# Patient Record
Sex: MALE | Race: WHITE | HISPANIC OR LATINO | Employment: OTHER | URBAN - METROPOLITAN AREA
[De-identification: names, ages, dates, MRNs, and addresses within clinical notes are randomized per-mention and may not be internally consistent; named-entity substitution may affect disease eponyms.]

---

## 2023-02-24 PROBLEM — R06.83 SNORING: Status: ACTIVE | Noted: 2023-02-24

## 2023-02-24 PROBLEM — R81 GLUCOSURIA: Status: ACTIVE | Noted: 2023-02-24

## 2023-02-24 PROBLEM — R74.8 ELEVATED CPK: Status: ACTIVE | Noted: 2023-02-24

## 2023-02-24 PROBLEM — E11.22 TYPE 2 DIABETES MELLITUS WITH STAGE 3A CHRONIC KIDNEY DISEASE, WITH LONG-TERM CURRENT USE OF INSULIN (MULTI): Status: ACTIVE | Noted: 2023-02-24

## 2023-02-24 PROBLEM — E66.811 CLASS 1 OBESITY DUE TO EXCESS CALORIES WITH BODY MASS INDEX (BMI) OF 32.0 TO 32.9 IN ADULT: Status: ACTIVE | Noted: 2023-02-24

## 2023-02-24 PROBLEM — E53.8 VITAMIN B12 DEFICIENCY: Status: ACTIVE | Noted: 2023-02-24

## 2023-02-24 PROBLEM — K21.9 GERD (GASTROESOPHAGEAL REFLUX DISEASE): Status: ACTIVE | Noted: 2023-02-24

## 2023-02-24 PROBLEM — N18.31 TYPE 2 DIABETES MELLITUS WITH STAGE 3A CHRONIC KIDNEY DISEASE, WITH LONG-TERM CURRENT USE OF INSULIN (MULTI): Status: ACTIVE | Noted: 2023-02-24

## 2023-02-24 PROBLEM — Z91.199 NONCOMPLIANCE: Status: ACTIVE | Noted: 2023-02-24

## 2023-02-24 PROBLEM — I10 ESSENTIAL HYPERTENSION: Status: ACTIVE | Noted: 2023-02-24

## 2023-02-24 PROBLEM — D63.8 ANEMIA, CHRONIC DISEASE: Status: ACTIVE | Noted: 2023-02-24

## 2023-02-24 PROBLEM — E66.09 CLASS 1 OBESITY DUE TO EXCESS CALORIES WITH BODY MASS INDEX (BMI) OF 32.0 TO 32.9 IN ADULT: Status: ACTIVE | Noted: 2023-02-24

## 2023-02-24 PROBLEM — Z87.828 HISTORY OF INJURY: Status: ACTIVE | Noted: 2023-02-24

## 2023-02-24 PROBLEM — E79.0 HYPERURICEMIA: Status: ACTIVE | Noted: 2023-02-24

## 2023-02-24 PROBLEM — M17.12 PRIMARY OSTEOARTHRITIS OF LEFT KNEE: Status: ACTIVE | Noted: 2023-02-24

## 2023-02-24 PROBLEM — E78.2 MIXED HYPERLIPIDEMIA: Status: ACTIVE | Noted: 2023-02-24

## 2023-02-24 PROBLEM — Z86.16 HISTORY OF COVID-19: Status: ACTIVE | Noted: 2023-02-24

## 2023-02-24 PROBLEM — E55.9 VITAMIN D DEFICIENCY: Status: ACTIVE | Noted: 2023-02-24

## 2023-02-24 PROBLEM — R80.9 MICROALBUMINURIA: Status: ACTIVE | Noted: 2023-02-24

## 2023-02-24 PROBLEM — Z79.4 TYPE 2 DIABETES MELLITUS WITH STAGE 3A CHRONIC KIDNEY DISEASE, WITH LONG-TERM CURRENT USE OF INSULIN (MULTI): Status: ACTIVE | Noted: 2023-02-24

## 2023-02-24 PROBLEM — N18.31 STAGE 3A CHRONIC KIDNEY DISEASE (MULTI): Status: ACTIVE | Noted: 2023-02-24

## 2023-02-24 PROBLEM — T78.3XXA ANGIO-EDEMA: Status: ACTIVE | Noted: 2023-02-24

## 2023-02-24 PROBLEM — R40.0 DAYTIME SLEEPINESS: Status: ACTIVE | Noted: 2023-02-24

## 2023-02-24 RX ORDER — ACETAMINOPHEN 500 MG
TABLET ORAL
COMMUNITY
Start: 2022-09-27

## 2023-02-24 RX ORDER — INSULIN LISPRO 100 [IU]/ML
INJECTION, SOLUTION INTRAVENOUS; SUBCUTANEOUS
COMMUNITY

## 2023-02-24 RX ORDER — SODIUM BICARBONATE 650 MG/1
TABLET ORAL
COMMUNITY
Start: 2021-05-13

## 2023-02-24 RX ORDER — FAMOTIDINE 20 MG/1
TABLET, FILM COATED ORAL
COMMUNITY
Start: 2022-09-27

## 2023-02-24 RX ORDER — ERGOCALCIFEROL 1.25 MG/1
CAPSULE ORAL
COMMUNITY
Start: 2019-08-10 | End: 2024-05-17 | Stop reason: SDUPTHER

## 2023-02-24 RX ORDER — PEN NEEDLE, DIABETIC 29 G X1/2"
NEEDLE, DISPOSABLE MISCELLANEOUS
COMMUNITY

## 2023-02-24 RX ORDER — ACETAMINOPHEN, DEXTROMETHORPHAN HBR, DOXYLAMINE SUCCINATE, PHENYLEPHRINE HCL 650; 20; 12.5; 1 MG/30ML; MG/30ML; MG/30ML; MG/30ML
SOLUTION ORAL
COMMUNITY
Start: 2021-02-26 | End: 2024-05-17 | Stop reason: SDUPTHER

## 2023-02-24 RX ORDER — INSULIN LISPRO 100 [IU]/ML
INJECTION, SOLUTION INTRAVENOUS; SUBCUTANEOUS
COMMUNITY
Start: 2021-04-01

## 2023-02-24 RX ORDER — POTASSIUM CHLORIDE 750 MG/1
TABLET, FILM COATED, EXTENDED RELEASE ORAL
COMMUNITY
Start: 2019-09-11

## 2023-02-24 RX ORDER — AMLODIPINE BESYLATE 5 MG/1
TABLET ORAL
COMMUNITY
End: 2023-04-21

## 2023-02-24 RX ORDER — METOPROLOL SUCCINATE 100 MG/1
TABLET, EXTENDED RELEASE ORAL
COMMUNITY
Start: 2022-05-03

## 2023-02-24 RX ORDER — ASPIRIN 81 MG/1
TABLET ORAL
COMMUNITY
Start: 2019-09-11

## 2023-02-24 RX ORDER — MAG CARB/ALUMINUM HYDROX/ALGIN 237.5-254
SUSPENSION, ORAL (FINAL DOSE FORM) ORAL
COMMUNITY
Start: 2022-09-27

## 2023-02-24 RX ORDER — CHLORTHALIDONE 50 MG/1
TABLET ORAL
COMMUNITY
Start: 2019-09-11

## 2023-02-24 RX ORDER — LISINOPRIL 20 MG/1
TABLET ORAL
COMMUNITY
Start: 2021-01-08 | End: 2023-08-04 | Stop reason: SDUPTHER

## 2023-02-24 RX ORDER — ISOPROPYL ALCOHOL 70 ML/100ML
SWAB TOPICAL
COMMUNITY

## 2023-02-24 RX ORDER — ISOSORBIDE MONONITRATE 60 MG/1
1 TABLET, EXTENDED RELEASE ORAL DAILY
COMMUNITY
Start: 2021-05-10

## 2023-02-24 RX ORDER — ALLOPURINOL 100 MG/1
TABLET ORAL
COMMUNITY

## 2023-02-24 RX ORDER — INSULIN GLARGINE 100 [IU]/ML
INJECTION, SOLUTION SUBCUTANEOUS
COMMUNITY
Start: 2021-04-01

## 2023-02-24 RX ORDER — ATORVASTATIN CALCIUM 40 MG/1
1 TABLET, FILM COATED ORAL NIGHTLY
COMMUNITY
Start: 2019-08-10

## 2023-03-08 ENCOUNTER — APPOINTMENT (OUTPATIENT)
Dept: PRIMARY CARE | Facility: CLINIC | Age: 68
End: 2023-03-08
Payer: COMMERCIAL

## 2023-03-29 ENCOUNTER — APPOINTMENT (OUTPATIENT)
Dept: PRIMARY CARE | Facility: CLINIC | Age: 68
End: 2023-03-29
Payer: COMMERCIAL

## 2023-04-04 ENCOUNTER — TELEPHONE (OUTPATIENT)
Dept: PRIMARY CARE | Facility: CLINIC | Age: 68
End: 2023-04-04

## 2023-04-04 ENCOUNTER — OFFICE VISIT (OUTPATIENT)
Dept: PRIMARY CARE | Facility: CLINIC | Age: 68
End: 2023-04-04
Payer: COMMERCIAL

## 2023-04-04 VITALS
BODY MASS INDEX: 32.9 KG/M2 | WEIGHT: 235 LBS | RESPIRATION RATE: 22 BRPM | DIASTOLIC BLOOD PRESSURE: 91 MMHG | HEIGHT: 71 IN | HEART RATE: 93 BPM | SYSTOLIC BLOOD PRESSURE: 192 MMHG | OXYGEN SATURATION: 95 %

## 2023-04-04 DIAGNOSIS — Z79.4 TYPE 2 DIABETES MELLITUS WITH STAGE 3A CHRONIC KIDNEY DISEASE, WITH LONG-TERM CURRENT USE OF INSULIN (MULTI): ICD-10-CM

## 2023-04-04 DIAGNOSIS — I10 HYPERTENSION, UNCONTROLLED: ICD-10-CM

## 2023-04-04 DIAGNOSIS — E78.2 MIXED HYPERLIPIDEMIA: ICD-10-CM

## 2023-04-04 DIAGNOSIS — E11.22 TYPE 2 DIABETES MELLITUS WITH STAGE 3A CHRONIC KIDNEY DISEASE, WITH LONG-TERM CURRENT USE OF INSULIN (MULTI): ICD-10-CM

## 2023-04-04 DIAGNOSIS — I10 ESSENTIAL HYPERTENSION: ICD-10-CM

## 2023-04-04 DIAGNOSIS — Z91.199 NONCOMPLIANCE: ICD-10-CM

## 2023-04-04 DIAGNOSIS — N18.31 TYPE 2 DIABETES MELLITUS WITH STAGE 3A CHRONIC KIDNEY DISEASE, WITH LONG-TERM CURRENT USE OF INSULIN (MULTI): ICD-10-CM

## 2023-04-04 DIAGNOSIS — S90.851A FOREIGN BODY IN RIGHT FOOT, INITIAL ENCOUNTER: ICD-10-CM

## 2023-04-04 DIAGNOSIS — L02.619 CELLULITIS AND ABSCESS OF FOOT: Primary | ICD-10-CM

## 2023-04-04 DIAGNOSIS — E79.0 HYPERURICEMIA: ICD-10-CM

## 2023-04-04 DIAGNOSIS — L03.119 CELLULITIS AND ABSCESS OF FOOT: Primary | ICD-10-CM

## 2023-04-04 DIAGNOSIS — D63.8 ANEMIA, CHRONIC DISEASE: ICD-10-CM

## 2023-04-04 PROCEDURE — 3080F DIAST BP >= 90 MM HG: CPT | Performed by: INTERNAL MEDICINE

## 2023-04-04 PROCEDURE — 3052F HG A1C>EQUAL 8.0%<EQUAL 9.0%: CPT | Performed by: INTERNAL MEDICINE

## 2023-04-04 PROCEDURE — 3077F SYST BP >= 140 MM HG: CPT | Performed by: INTERNAL MEDICINE

## 2023-04-04 PROCEDURE — 1036F TOBACCO NON-USER: CPT | Performed by: INTERNAL MEDICINE

## 2023-04-04 PROCEDURE — 4010F ACE/ARB THERAPY RXD/TAKEN: CPT | Performed by: INTERNAL MEDICINE

## 2023-04-04 PROCEDURE — 1159F MED LIST DOCD IN RCRD: CPT | Performed by: INTERNAL MEDICINE

## 2023-04-04 PROCEDURE — 99214 OFFICE O/P EST MOD 30 MIN: CPT | Performed by: INTERNAL MEDICINE

## 2023-04-04 RX ORDER — HYDRALAZINE HYDROCHLORIDE 50 MG/1
1 TABLET, FILM COATED ORAL EVERY 8 HOURS
COMMUNITY
Start: 2020-06-09

## 2023-04-04 RX ORDER — HYDROCHLOROTHIAZIDE 25 MG/1
TABLET ORAL
COMMUNITY
Start: 2020-12-31 | End: 2023-04-04 | Stop reason: ALTCHOICE

## 2023-04-04 NOTE — PATIENT INSTRUCTIONS
Thank you very much for coming.  I am glad to see you.    Please keep your next appointment.  It is hard when you miss your appointments, because we missed the opportunity to continue from previous plans.    In any event, right now, the biggest challenge is that you do have another infection, this time affecting the bottom of your RIGHT FOOT.  This is potentially serious.  Without treatment, you could end up losing your foot.  Again, very serious.    You need to see a PODIATRIST or foot doctor, as well as a WOUND CARE SPECIALIST.  Arrangements have to be made now.    You also need to continue to follow with your diabetes doctor, Dr. Jameson, ENDOCRINOLOGY.  You also have to finally establish with the kidney doctor, Dr. Patel, NEPHROLOGY.    I will also need to see you again in 2 weeks, so that I can make sure that your infection is getting better, and that you are able to follow through with your appointments.  We may need to repeat some laboratory examinations at that point, depending on how you are doing.    In the meantime, please call me and tell me what ANTIBIOTICS you are taking from the ER.  Very important, please call me today and let me know.    Likewise, your blood pressure is elevated and uncontrolled.  Just like you said at home, with the first number running in the 170s.  Goal is to keep the first number of your blood pressure 135 or lower, and the second number to be 84 or lower.    Please check your medications at home.  Let us see if we need to adjust or change your blood pressure regimens:  AMLODIPINE 5 mg by mouth every day.  ISOSORBIDE MONONITRATE 60 mg by mouth every day.  LISINOPRIL 20 mg by mouth every day.  METOPROLOL SUCCINATE 100 mg, HALF tablet with breakfast, and FULL tablet with supper.  CHLORTHALIDONE 50 mg with breakfast every day.  HYDRALAZINE 50 mg by mouth 3 times a day.    Again, check your medications, and if you are missing any, or if you are not taking your medications differently,  please let me know.    You are also taking a supplemental POTASSIUM tablet.  If you are missing this, please let me know.    Please drink lots of fluids, and please avoid salt.  When you are able, stay off your right foot.  Get yourself a walking stick, so that you can put less weight on your right foot.  Very important.    Please come back in 2 weeks.  Call sooner as needed, especially with the name of the antibiotic, as well as any missing blood pressure medications, above.    I hope you have a blessed holy week and a happy Easter.  It is very important that you please take care of yourself.  Please come back in 2 weeks, but call sooner as needed.  Please keep your appointments, especially with podiatry, wound care, and make sure you contact Dr. Patel for follow-up, and also, contact Dr. Jameson to let him know how things are going.  Again, thank you for coming.            0  Noncompliance limiting patient care.  Patient understands that he has another bout of CELLULITIS, and this needs attention, or else it will be very debilitating and even disabling.  Son-in-law in attendance, making sure that the patient understands my concerns as well.    Return in 2 months.  40 minutes please.  Reassess hemodynamics, potential debility.  Coordinate with wound care, podiatry, nephrology, endocrinology.  Reassess compliance, mood, energy, function.  If stable, review preventive strategies.  Consider repeat blood examinations as warranted.            0

## 2023-04-04 NOTE — PROGRESS NOTES
Patient is here today with complaint of pain in RIGHT FOOT.Subjective   Patient ID: Markos Interiano is a 67 y.o. male who presents for Patient is in today with complaint of right foot pain.    HPI   Somewhat lost to follow-up, now here for routine reevaluation, but he understands that he now has an infection, right foot, with what appears to be a FOREIGN BODY.  Seen in the ER yesterday, and was started on unrecalled antibiotics.  In the meantime, the patient does not fully understand that he potentially could suffer from a disabling and debilitating loss of limb.  This was explained to him today.    In the meantime, he states that he has been compliant with diabetes regimens, and that his blood sugar readings have been in the 150s.  He follows closely with ENDOCRINOLOGY, Dr. Jameson.  ENDOCRINE with no polyuria, polydipsia, polyphagia.  No blurred vision.  No skin, hair, nail changes.  No dramatic weight loss or weight gain.      He has yet to reestablish with NEPHROLOGY, Dr. Patel.  In the meantime, he states that his appetite is good, no lingering anorexia or nausea.  No lethargy, no confusion.  No skin changes.  No change in urine character or habits.  In other words, seemingly without any symptoms referable to uremia.  Unfortunately, he does not recall his blood pressure regimens, and does point out that his blood pressure has been running 170s systolically.  Still, he denies any symptoms referable to hemodynamic instability.  No landon chest pain.  No orthopnea, no paroxysmal nocturnal dyspnea.  No dizziness, diaphoresis, palpitations.  No loss of consciousness.  No bipedal edema.  No remarkable dyspnea on exertion.  No headache, blurred vision, diplopia.  No dysphagia.  No focal weakness, ataxia, clumsiness.  No falls.  No paresthesia.  No confusion or delirium, with patient not worried about memory.  Not depressive or suicidal, with patient not wishing harm to self or others.      Careful about exposure.   "No coughing, no sputum production.  States no other skin changes, rashes, pruritus, jaundice.  No easy bruisability.  CONSTITUTIONALLY, no fever, no chills.  No night sweats.  No lingering anorexia or nausea.  No apparent lymphadenopathy.  No apparent weight loss.        Review of Systems  Review of systems as in history of present illness, and otherwise, reviewed separately as well, and was unremarkable/negative/noncontributory.          Objective   BP (!) 192/91 (BP Location: Right arm, Patient Position: Sitting, BP Cuff Size: Adult)   Pulse 93   Resp 22   Ht 1.803 m (5' 11\")   Wt 107 kg (235 lb)   SpO2 95%   BMI 32.78 kg/m²     Physical Exam  Currently, not in distress, not diaphoretic.  Receptive, oriented x3.  Amiable.  Not unkempt.  Seemingly appropriate, and with the help of son-in-law, does understand now the gravity of his situation.  Promises to be more compliant.  Does not wish harm to self or others.  Wants to improve quality of life.    HEAD pink palpebral conjunctivae, anicteric sclerae.  NECK supple, no apparent jugular venous distention.  CARDIOVASCULAR not in distress or diaphoresis.  No bipedal edema.  LUNGS not in distress or diaphoresis.  Not using accessory muscles.  ABDOMEN soft, nontender.  BACK no costovertebral angle tenderness.  EXTREMITIES no clubbing, no cyanosis.  SKIN with ulcer, eschar, surrounding induration, lateral aspect, plantar surface, right foot.  Perhaps minimal tenderness, but also perhaps some mild anesthesia superficially surrounding area.  NEURO no facial asymmetry.  No apparent cranial nerve deficits.  Modestly favoring right foot.  Otherwise, not needing assistive device.  No other apparent focal weakness.  No tremors.    PSYCH receptive, appropriate, and eager to maintain and improve quality of life.  Promises to be more compliant.  Contrite.  Son-in-law in attendance, making sure that we understand each other.        Assessment/Plan   Problem List Items Addressed " This Visit          Circulatory    Essential hypertension    Relevant Orders    Referral to Nephrology    Follow Up In Primary Care    Hypertension, uncontrolled    Relevant Orders    Referral to Nephrology    Follow Up In Primary Care       Endocrine/Metabolic    Type 2 diabetes mellitus with stage 3a chronic kidney disease, with long-term current use of insulin (CMS/[facility])    Relevant Orders    Referral to Podiatry    Referral to Wound Clinic    Referral to Nephrology    Follow Up In Primary Care       Hematologic    Anemia, chronic disease    Relevant Orders    Referral to Nephrology    Follow Up In Primary Care       Other    Hyperuricemia    Relevant Orders    Referral to Nephrology    Follow Up In Primary Care    Mixed hyperlipidemia    Relevant Orders    Referral to Nephrology    Follow Up In Primary Care    Noncompliance    Relevant Orders    Follow Up In Primary Care     Other Visit Diagnoses       Cellulitis and abscess of foot    -  Primary    Relevant Orders    Referral to Podiatry    Referral to Wound Clinic    Follow Up In Primary Care    Foreign body in right foot, initial encounter        Relevant Orders    Referral to Podiatry    Referral to Wound Clinic    Follow Up In Primary Care             Thank you very much for coming.  I am glad to see you.    Please keep your next appointment.  It is hard when you miss your appointments, because we missed the opportunity to continue from previous plans.    In any event, right now, the biggest challenge is that you do have another infection, this time affecting the bottom of your RIGHT FOOT.  This is potentially serious.  Without treatment, you could end up losing your foot.  Again, very serious.    You need to see a PODIATRIST or foot doctor, as well as a WOUND CARE SPECIALIST.  Arrangements have to be made now.    You also need to continue to follow with your diabetes doctor, Dr. Jameson, ENDOCRINOLOGY.  You also have to finally establish with the kidney  doctor, Dr. Patel, NEPHROLOGY.    I will also need to see you again in 2 weeks, so that I can make sure that your infection is getting better, and that you are able to follow through with your appointments.  We may need to repeat some laboratory examinations at that point, depending on how you are doing.    In the meantime, please call me and tell me what ANTIBIOTICS you are taking from the ER.  Very important, please call me today and let me know.    Likewise, your blood pressure is elevated and uncontrolled.  Just like you said at home, with the first number running in the 170s.  Goal is to keep the first number of your blood pressure 135 or lower, and the second number to be 84 or lower.    Please check your medications at home.  Let us see if we need to adjust or change your blood pressure regimens:  AMLODIPINE 5 mg by mouth every day.  ISOSORBIDE MONONITRATE 60 mg by mouth every day.  LISINOPRIL 20 mg by mouth every day.  METOPROLOL SUCCINATE 100 mg, HALF tablet with breakfast, and FULL tablet with supper.  CHLORTHALIDONE 50 mg with breakfast every day.  HYDRALAZINE 50 mg by mouth 3 times a day.    Again, check your medications, and if you are missing any, or if you are not taking your medications differently, please let me know.    You are also taking a supplemental POTASSIUM tablet.  If you are missing this, please let me know.    Please drink lots of fluids, and please avoid salt.  When you are able, stay off your right foot.  Get yourself a walking stick, so that you can put less weight on your right foot.  Very important.    Please come back in 2 weeks.  Call sooner as needed, especially with the name of the antibiotic, as well as any missing blood pressure medications, above.    I hope you have a blessed holy week and a happy Easter.  It is very important that you please take care of yourself.  Please come back in 2 weeks, but call sooner as needed.  Please keep your appointments, especially with  podiatry, wound care, and make sure you contact Dr. Patel for follow-up, and also, contact Dr. Jameson to let him know how things are going.  Again, thank you for coming.            0  Noncompliance limiting patient care.  Patient understands that he has another bout of CELLULITIS, and this needs attention, or else it will be very debilitating and even disabling.  Son-in-law in attendance, making sure that the patient understands my concerns as well.    Return in 2 months.  40 minutes please.  Reassess hemodynamics, potential debility.  Coordinate with wound care, podiatry, nephrology, endocrinology.  Reassess compliance, mood, energy, function.  If stable, review preventive strategies.  Consider repeat blood examinations as warranted.            0

## 2023-04-06 NOTE — TELEPHONE ENCOUNTER
Pt's son in law called back with antibiotic name, says he received Sulfameth 800mg from [facility] for his foot.

## 2023-04-13 ENCOUNTER — PATIENT OUTREACH (OUTPATIENT)
Dept: CARE COORDINATION | Facility: CLINIC | Age: 68
End: 2023-04-13
Payer: COMMERCIAL

## 2023-04-13 DIAGNOSIS — Z78.9 FAILURE OF OUTPATIENT TREATMENT: ICD-10-CM

## 2023-04-13 DIAGNOSIS — L97.509 DIABETIC FOOT ULCER ASSOCIATED WITH OTHER SPECIFIED DIABETES MELLITUS, UNSPECIFIED LATERALITY, UNSPECIFIED PART OF FOOT, UNSPECIFIED ULCER STAGE (MULTI): ICD-10-CM

## 2023-04-13 DIAGNOSIS — E13.621 DIABETIC FOOT ULCER ASSOCIATED WITH OTHER SPECIFIED DIABETES MELLITUS, UNSPECIFIED LATERALITY, UNSPECIFIED PART OF FOOT, UNSPECIFIED ULCER STAGE (MULTI): ICD-10-CM

## 2023-04-13 DIAGNOSIS — L03.115 CELLULITIS OF RIGHT FOOT: ICD-10-CM

## 2023-04-13 RX ORDER — AMOXICILLIN AND CLAVULANATE POTASSIUM 500; 125 MG/1; MG/1
500 TABLET, FILM COATED ORAL 3 TIMES DAILY
COMMUNITY
Start: 2023-04-11 | End: 2023-12-22 | Stop reason: ALTCHOICE

## 2023-04-13 NOTE — PROGRESS NOTES
Discharge Facility: [facility]  Discharge Diagnosis:E11.621 Diabetic foot ulcer, L03.115 Cellulitis of right foot, Z78.9 Failure of outpatient treatment   Admission Date:4/10/23  Discharge Date:4/11/23    PCP appt:4/21/23    Engagement  Call Start Time: 1214 (4/13/2023 12:19 PM)    Medications  Medications reviewed with patient/caregiver?: Yes (4/13/2023 12:19 PM)  Is the patient having any side effects they believe may be caused by any medication additions or changes?: No (4/13/2023 12:19 PM)  Does the patient have all medications ordered at discharge?: Yes (4/13/2023 12:19 PM)  Care Management Interventions: Provided patient education (4/13/2023 12:19 PM)  Is the patient taking all medications as directed (includes completed medication regime)?: Yes (4/13/2023 12:19 PM)  Care Management Interventions: Provided patient education (4/13/2023 12:19 PM)    Appointments  Does the patient have a primary care provider?: Yes (4/13/2023 12:19 PM)  Care Management Interventions: Verified appointment date/time/provider (4/13/2023 12:19 PM)  Has the patient kept scheduled appointments due by today?: Yes (4/13/2023 12:19 PM)  Care Management Interventions: Advised patient to keep appointment; Educated on importance of keeping appointment (4/13/2023 12:19 PM)    Self Management  What is the home health agency?:  Home care (4/13/2023 12:19 PM)  Has home health visited the patient within 72 hours of discharge?: Call prior to 72 hours (4/13/2023 12:19 PM)    Patient Teaching  Does the patient have access to their discharge instructions?: Yes (4/13/2023 12:19 PM)  Care Management Interventions: Reviewed instructions with patient (4/13/2023 12:19 PM)  What is the patient's perception of their health status since discharge?: Improving (4/13/2023 12:19 PM)  Is the patient/caregiver able to teach back the hierarchy of who to call/visit for symptoms/problems? PCP, Specialist, Home Health nurse, Urgent Care, ED, 911: Yes (4/13/2023 12:19  PM)

## 2023-04-14 LAB
GRAM STAIN: ABNORMAL
TISSUE/WOUND CULTURE/SMEAR: ABNORMAL

## 2023-04-21 ENCOUNTER — OFFICE VISIT (OUTPATIENT)
Dept: PRIMARY CARE | Facility: CLINIC | Age: 68
End: 2023-04-21
Payer: COMMERCIAL

## 2023-04-21 VITALS
OXYGEN SATURATION: 97 % | HEIGHT: 71 IN | DIASTOLIC BLOOD PRESSURE: 92 MMHG | SYSTOLIC BLOOD PRESSURE: 162 MMHG | WEIGHT: 224 LBS | BODY MASS INDEX: 31.36 KG/M2 | HEART RATE: 77 BPM

## 2023-04-21 DIAGNOSIS — L03.119 CELLULITIS AND ABSCESS OF FOOT: ICD-10-CM

## 2023-04-21 DIAGNOSIS — E11.621 DIABETIC ULCER OF RIGHT MIDFOOT ASSOCIATED WITH TYPE 2 DIABETES MELLITUS, WITH FAT LAYER EXPOSED (MULTI): ICD-10-CM

## 2023-04-21 DIAGNOSIS — N18.31 TYPE 2 DIABETES MELLITUS WITH STAGE 3A CHRONIC KIDNEY DISEASE, WITH LONG-TERM CURRENT USE OF INSULIN (MULTI): ICD-10-CM

## 2023-04-21 DIAGNOSIS — E78.2 MIXED HYPERLIPIDEMIA: ICD-10-CM

## 2023-04-21 DIAGNOSIS — L97.412 DIABETIC ULCER OF RIGHT MIDFOOT ASSOCIATED WITH TYPE 2 DIABETES MELLITUS, WITH FAT LAYER EXPOSED (MULTI): ICD-10-CM

## 2023-04-21 DIAGNOSIS — E79.0 HYPERURICEMIA: ICD-10-CM

## 2023-04-21 DIAGNOSIS — I10 ESSENTIAL HYPERTENSION: ICD-10-CM

## 2023-04-21 DIAGNOSIS — E11.22 TYPE 2 DIABETES MELLITUS WITH STAGE 3A CHRONIC KIDNEY DISEASE, WITH LONG-TERM CURRENT USE OF INSULIN (MULTI): ICD-10-CM

## 2023-04-21 DIAGNOSIS — D63.8 ANEMIA, CHRONIC DISEASE: ICD-10-CM

## 2023-04-21 DIAGNOSIS — I10 HYPERTENSION, UNCONTROLLED: ICD-10-CM

## 2023-04-21 DIAGNOSIS — Z79.4 TYPE 2 DIABETES MELLITUS WITH STAGE 3A CHRONIC KIDNEY DISEASE, WITH LONG-TERM CURRENT USE OF INSULIN (MULTI): ICD-10-CM

## 2023-04-21 DIAGNOSIS — Z91.199 NONCOMPLIANCE: ICD-10-CM

## 2023-04-21 DIAGNOSIS — L02.619 CELLULITIS AND ABSCESS OF FOOT: ICD-10-CM

## 2023-04-21 PROBLEM — L97.509 DIABETIC FOOT ULCER (MULTI): Status: ACTIVE | Noted: 2023-04-21

## 2023-04-21 PROBLEM — L03.115 CELLULITIS OF FOOT, RIGHT: Status: ACTIVE | Noted: 2023-04-21

## 2023-04-21 PROBLEM — L03.115 CELLULITIS OF RIGHT LOWER LEG: Status: ACTIVE | Noted: 2023-04-21

## 2023-04-21 PROCEDURE — 4010F ACE/ARB THERAPY RXD/TAKEN: CPT | Performed by: INTERNAL MEDICINE

## 2023-04-21 PROCEDURE — 3077F SYST BP >= 140 MM HG: CPT | Performed by: INTERNAL MEDICINE

## 2023-04-21 PROCEDURE — 1036F TOBACCO NON-USER: CPT | Performed by: INTERNAL MEDICINE

## 2023-04-21 PROCEDURE — 3080F DIAST BP >= 90 MM HG: CPT | Performed by: INTERNAL MEDICINE

## 2023-04-21 PROCEDURE — 3066F NEPHROPATHY DOC TX: CPT | Performed by: INTERNAL MEDICINE

## 2023-04-21 PROCEDURE — 3051F HG A1C>EQUAL 7.0%<8.0%: CPT | Performed by: INTERNAL MEDICINE

## 2023-04-21 PROCEDURE — 1160F RVW MEDS BY RX/DR IN RCRD: CPT | Performed by: INTERNAL MEDICINE

## 2023-04-21 PROCEDURE — 1159F MED LIST DOCD IN RCRD: CPT | Performed by: INTERNAL MEDICINE

## 2023-04-21 PROCEDURE — 99213 OFFICE O/P EST LOW 20 MIN: CPT | Performed by: INTERNAL MEDICINE

## 2023-04-21 RX ORDER — MUPIROCIN 20 MG/G
OINTMENT TOPICAL
COMMUNITY
Start: 2023-04-20

## 2023-04-21 RX ORDER — AMLODIPINE BESYLATE 10 MG/1
10 TABLET ORAL DAILY
Qty: 30 TABLET | Refills: 5 | Status: SHIPPED | OUTPATIENT
Start: 2023-04-21 | End: 2024-01-18 | Stop reason: SDUPTHER

## 2023-04-21 RX ORDER — SULFAMETHOXAZOLE AND TRIMETHOPRIM 800; 160 MG/1; MG/1
1 TABLET ORAL 2 TIMES DAILY
COMMUNITY
Start: 2023-04-03 | End: 2023-12-22 | Stop reason: ALTCHOICE

## 2023-04-21 NOTE — PATIENT INSTRUCTIONS
Thank you very much for coming.  I am very happy to see you.    Please continue to follow-up with your WOUND CARE SPECIALIST, as well as your PODIATRIST.  Remember, you have to take care of your right foot so that you can save it from further injury.  Remember that diabetics are at higher risk for amputation.    Please continue following with ENDOCRINOLOGY, Dr. Jameson, as well.  Your last hemoglobin A1c is 7.7.  This is very good!    Unfortunately, your blood pressure is elevated, and your kidney function is sluggish.  Please drink lots of fluids throughout the day.  Please continue to avoid salt.  Please continue taking your HYDRALAZINE, ISOSORBIDE, LISINOPRIL, and METOPROLOL.  Please check at home and if you are running low, please let me know, so that I can refill your medications.    Please INCREASE AMLODIPINE from 5 mg, now take 10 mg by mouth every day.  Amlodipine can cause some modest swelling of your ankles, and perhaps even some constipation.  Again, drink lots of fluids, and avoid salt.  Consider Metamucil.    You will also benefit from seeing Dr. Patel, NEPHROLOGY.  Again, diabetics are at higher risk of sluggish kidneys, and we will need his help!    Please come back in 6 weeks, so that I can see how you are doing.  Until then, please continue to take care of yourself and each other, and please continue to pray for our recovery from this pandemic.    I hope you had a good Easter.  I do wish you a happy springtime and summertime!  Please call sooner as needed.            0  Compliant seemingly better.  Hemoglobin A1c noted at 7.7, better, as well.  Following closely with wound care, podiatry, and understands the risk of amputation.    Blood pressure has remained elevated.  Reevaluating compliance, patient will check his medications at home, and call as needed.  I will also increase his AMLODIPINE, and get him to see NEPHROLOGY.    Return in 6 weeks.  20 minutes please.  Reassess compliance, tolerance,  hemodynamics, cardiovascular risk.  Coordinate with podiatry, chronic pain, nephrology, endocrinology.  Likewise, review preventive strategies.  Plan for Medicare wellness soon as well.            0

## 2023-04-21 NOTE — PROGRESS NOTES
"Subjective   Patient ID: Markos Interiano is a 67 y.o. male who presents for 2 Week FU (Pt in today for 2 week FU).    HPI   The patient understands risks, and has been now compliant with not only medications, but also has been seeing WOUND CARE as well as PODIATRY more regularly.  Also being seen by ENDOCRINOLOGY.  ENDOCRINE with no polyuria, polydipsia, polyphagia.  No blurred vision.  No skin, hair, nail changes.  No dramatic weight loss or weight gain.  CONSTITUTIONALLY, no fever, no chills.  No night sweats.  No lingering anorexia or nausea.  No apparent lymphadenopathy.  No apparent weight loss.    Blood pressure remains elevated, although patient states that he has been compliant with medications, just cannot recall his current regimens at this time.  Denies chest pain, palpitations, orthopnea, paroxysmal nocturnal dyspnea.  Careful about exposure.  No coughing, no sputum production.  Appetite preserved, no abdominal distress.  No diarrhea.  Likewise, no odynophagia or dysphagia, no thrush.  Also, no dysuria, flank, suprapubic pain, no pruritus.  No other skin changes noted.        Review of Systems  Review of systems as in history of present illness, and otherwise, reviewed separately as well, and was unremarkable/negative/noncontributory.           Objective   BP (!) 162/92 (BP Location: Right arm, Patient Position: Sitting)   Pulse 77   Ht 1.803 m (5' 11\")   Wt 102 kg (224 lb)   SpO2 97%   BMI 31.24 kg/m²     Physical Exam  Son-in-law in attendance, making sure that we understand each other.  In the meantime, not in distress or diaphoresis, and eager to get better.  Not wishing harm to self or others.  Moderately obese build.  Walking without need of assistive device.  Soft boot in place, right foot.    HEAD pink palpebral conjunctivae, anicteric sclerae.  NECK supple, no apparent jugular venous distention.  CARDIOVASCULAR not in distress or diaphoresis.  No bipedal edema.  LUNGS not in distress " or diaphoresis.  Not using accessory muscles.  ABDOMEN soft, nontender.  BACK no costovertebral angle tenderness.  EXTREMITIES no clubbing, no cyanosis.  NEURO no facial asymmetry.  No apparent cranial nerve deficits.  Romberg negative.  Favoring right leg/foot, with minimal ataxia, but not needing any assistive device.  No apparent focal weakness.  No tremors.  PSYCH receptive, appropriate, and eager to maintain and improve quality of life.         Assessment/Plan   Problem List Items Addressed This Visit          Circulatory    Essential hypertension    Relevant Medications    amLODIPine (Norvasc) 10 mg tablet    Other Relevant Orders    Follow Up In Primary Care    Referral to Nephrology    Hypertension, uncontrolled    Relevant Orders    Follow Up In Primary Care    Referral to Nephrology       Musculoskeletal    Diabetic foot ulcer (CMS/HCC)    Relevant Orders    Follow Up In Primary Care       Endocrine/Metabolic    Type 2 diabetes mellitus with stage 3a chronic kidney disease, with long-term current use of insulin (CMS/HCC)    Relevant Orders    Follow Up In Primary Care    Referral to Nephrology       Hematologic    Anemia, chronic disease    Relevant Orders    Follow Up In Primary Care    Referral to Nephrology       Other    Hyperuricemia    Relevant Orders    Follow Up In Primary Care    Referral to Nephrology    Mixed hyperlipidemia    Relevant Orders    Follow Up In Primary Care    Referral to Nephrology    Noncompliance    Relevant Orders    Follow Up In Primary Care     Other Visit Diagnoses       Cellulitis and abscess of foot        Relevant Orders    Follow Up In Primary Care             Thank you very much for coming.  I am very happy to see you.    Please continue to follow-up with your WOUND CARE SPECIALIST, as well as your PODIATRIST.  Remember, you have to take care of your right foot so that you can save it from further injury.  Remember that diabetics are at higher risk for  amputation.    Please continue following with ENDOCRINOLOGY, Dr. Jameson, as well.  Your last hemoglobin A1c is 7.7.  This is very good!    Unfortunately, your blood pressure is elevated, and your kidney function is sluggish.  Please drink lots of fluids throughout the day.  Please continue to avoid salt.  Please continue taking your HYDRALAZINE, ISOSORBIDE, LISINOPRIL, and METOPROLOL.  Please check at home and if you are running low, please let me know, so that I can refill your medications.    Please INCREASE AMLODIPINE from 5 mg, now take 10 mg by mouth every day.  Amlodipine can cause some modest swelling of your ankles, and perhaps even some constipation.  Again, drink lots of fluids, and avoid salt.  Consider Metamucil.    You will also benefit from seeing Dr. Patel, NEPHROLOGY.  Again, diabetics are at higher risk of sluggish kidneys, and we will need his help!    Please come back in 6 weeks, so that I can see how you are doing.  Until then, please continue to take care of yourself and each other, and please continue to pray for our recovery from this pandemic.    I hope you had a good Easter.  I do wish you a happy springtime and summertime!  Please call sooner as needed.            0  Compliant seemingly better.  Hemoglobin A1c noted at 7.7, better, as well.  Following closely with wound care, podiatry, and understands the risk of amputation.    Blood pressure has remained elevated.  Reevaluating compliance, patient will check his medications at home, and call as needed.  I will also increase his AMLODIPINE, and get him to see NEPHROLOGY.    Return in 6 weeks.  20 minutes please.  Reassess compliance, tolerance, hemodynamics, cardiovascular risk.  Coordinate with podiatry, chronic pain, nephrology, endocrinology.  Likewise, review preventive strategies.  Plan for Medicare wellness soon as well.            0

## 2023-04-27 ENCOUNTER — PATIENT OUTREACH (OUTPATIENT)
Dept: CARE COORDINATION | Facility: CLINIC | Age: 68
End: 2023-04-27
Payer: COMMERCIAL

## 2023-04-27 NOTE — PROGRESS NOTES
Call regarding appt. with PCP on 4/21/23 after hospitalization.  At time of outreach call the patient feels as if their condition has improved since last visit.  Reviewed the PCP appointment with the pt and addressed any questions or concerns.

## 2023-06-02 ENCOUNTER — OFFICE VISIT (OUTPATIENT)
Dept: PRIMARY CARE | Facility: CLINIC | Age: 68
End: 2023-06-02
Payer: COMMERCIAL

## 2023-06-02 VITALS
HEIGHT: 71 IN | WEIGHT: 226.2 LBS | HEART RATE: 72 BPM | OXYGEN SATURATION: 96 % | RESPIRATION RATE: 20 BRPM | DIASTOLIC BLOOD PRESSURE: 80 MMHG | BODY MASS INDEX: 31.67 KG/M2 | SYSTOLIC BLOOD PRESSURE: 142 MMHG

## 2023-06-02 DIAGNOSIS — L97.412 DIABETIC ULCER OF RIGHT MIDFOOT ASSOCIATED WITH TYPE 2 DIABETES MELLITUS, WITH FAT LAYER EXPOSED (MULTI): ICD-10-CM

## 2023-06-02 DIAGNOSIS — N18.31 TYPE 2 DIABETES MELLITUS WITH STAGE 3A CHRONIC KIDNEY DISEASE, WITH LONG-TERM CURRENT USE OF INSULIN (MULTI): ICD-10-CM

## 2023-06-02 DIAGNOSIS — E11.621 DIABETIC ULCER OF RIGHT MIDFOOT ASSOCIATED WITH TYPE 2 DIABETES MELLITUS, WITH FAT LAYER EXPOSED (MULTI): ICD-10-CM

## 2023-06-02 DIAGNOSIS — D63.8 ANEMIA, CHRONIC DISEASE: ICD-10-CM

## 2023-06-02 DIAGNOSIS — Z79.4 TYPE 2 DIABETES MELLITUS WITH STAGE 3A CHRONIC KIDNEY DISEASE, WITH LONG-TERM CURRENT USE OF INSULIN (MULTI): ICD-10-CM

## 2023-06-02 DIAGNOSIS — I10 ESSENTIAL HYPERTENSION: ICD-10-CM

## 2023-06-02 DIAGNOSIS — E79.0 HYPERURICEMIA: ICD-10-CM

## 2023-06-02 DIAGNOSIS — E11.22 TYPE 2 DIABETES MELLITUS WITH STAGE 3A CHRONIC KIDNEY DISEASE, WITH LONG-TERM CURRENT USE OF INSULIN (MULTI): ICD-10-CM

## 2023-06-02 DIAGNOSIS — E78.2 MIXED HYPERLIPIDEMIA: ICD-10-CM

## 2023-06-02 PROCEDURE — 3066F NEPHROPATHY DOC TX: CPT | Performed by: INTERNAL MEDICINE

## 2023-06-02 PROCEDURE — 1160F RVW MEDS BY RX/DR IN RCRD: CPT | Performed by: INTERNAL MEDICINE

## 2023-06-02 PROCEDURE — 99213 OFFICE O/P EST LOW 20 MIN: CPT | Performed by: INTERNAL MEDICINE

## 2023-06-02 PROCEDURE — 3051F HG A1C>EQUAL 7.0%<8.0%: CPT | Performed by: INTERNAL MEDICINE

## 2023-06-02 PROCEDURE — 3077F SYST BP >= 140 MM HG: CPT | Performed by: INTERNAL MEDICINE

## 2023-06-02 PROCEDURE — 4010F ACE/ARB THERAPY RXD/TAKEN: CPT | Performed by: INTERNAL MEDICINE

## 2023-06-02 PROCEDURE — 1159F MED LIST DOCD IN RCRD: CPT | Performed by: INTERNAL MEDICINE

## 2023-06-02 PROCEDURE — 3079F DIAST BP 80-89 MM HG: CPT | Performed by: INTERNAL MEDICINE

## 2023-06-02 PROCEDURE — 1036F TOBACCO NON-USER: CPT | Performed by: INTERNAL MEDICINE

## 2023-06-02 NOTE — PROGRESS NOTES
"Subjective   Patient ID: Markos Interiano is a 67 y.o. male who presents for Follow-up.    HPI   Followed closely by NEPHROLOGY, with excellent response, blood pressure now better controlled.  Compliant with medications, tolerating regimens.  No landon substernal chest pain, no orthopnea, no paroxysmal nocturnal dyspnea.    Likewise, blood sugars seemingly more controlled, following closely with ENDOCRINOLOGY.  ENDOCRINE with no polyuria, polydipsia, polyphagia.  No blurred vision.  No skin, hair, nail changes.  No dramatic weight loss or weight gain.      Seen by PODIATRY, states a foreign body was found, right foot.  Since visit, and with debridement, excellent response in recovery, with no pain, no ataxia.  Patient has been physically active.  No new skin changes, rashes, pruritus, jaundice.  No easy bruisability.          Review of Systems  Review of systems as in history of present illness, and otherwise, reviewed separately as well, and was unremarkable/negative/noncontributory.          Objective   /80 (BP Location: Right arm, Patient Position: Sitting, BP Cuff Size: Adult)   Pulse 72   Resp 20   Ht 1.803 m (5' 11\")   Wt 103 kg (226 lb 3.2 oz)   SpO2 96%   BMI 31.55 kg/m²     Physical Exam  In good spirits.  Not in distress or diaphoresis.  Alert, oriented x3.  Amiable.  Not unkempt.  Receptive.  Cheerful.  Appropriate.  Eager to stay healthy, independent, and productive.  Does not wish harm to self or others.    HEAD pink palpebral conjunctivae, anicteric sclerae.  Mucous membranes moist.  No tonsillopharyngeal congestion, no thrush.  NECK supple, no apparent jugular venous distention.  CARDIOVASCULAR not in distress or diaphoresis.  No bipedal edema.  Regular rate and rhythm.  No murmurs.  LUNGS not in distress or diaphoresis.  Not using accessory muscles.  Clear to auscultation bilaterally.  ABDOMEN soft, nontender.  BACK no costovertebral angle tenderness.  EXTREMITIES no clubbing, no " cyanosis.  NEURO no facial asymmetry.  No apparent cranial nerve deficits.  Romberg negative.  Ambulating without need of assistance.  No apparent focal weakness.  No tremors.  PSYCH receptive, appropriate, and eager to maintain and improve quality of life.          Assessment/Plan        Thank you very much for coming.  I am very happy to see you.    I am glad to hear that you did very well with the help of Dr. Collins, PODIATRY!  I will await word from her, and I will coordinate with her recommendations.  I would like to examine your foot again sometime soon.  If you notice any new changes, please let Dr. Collins or let me know.    I do understand that you will see Dr. Jameson, ENDOCRINOLOGY, next month.  I am eager to hear what he has to say.  In the meantime, your blood sugar marker, hemoglobin A1c, has been better at 7.7!  The goal is 6.5 or less.  You are on your way.  Please continue to take your medications, and please continue to eat sensibly, avoiding sugar and starch.  Please continue to stay physically active.  I do appreciate your efforts.  Everything is going in the right direction!    Likewise, please continue seeing Dr. Patel, NEPHROLOGY.  Your blood pressure is getting better and is close to being ideal.  We would like the first number of your blood pressure to be 135 or less.  Continue taking your current medications.  Drink lots of fluids.  Avoid salt.  Stay physically active.  All of these contribute to better blood pressure readings.    I would like to see you in 2 months to recheck everything, and to do your Medicare Wellness visit.  I may need to repeat some lab work, but let us wait and see what the other doctors order first.    Again, thank you very much for coming.  Please come back in 2 months.  Until then, please continue to take care of yourself and each other, and please continue to pray for our recovery from this pandemic.  Advanced happy Father's Day to all of us!            0  Here with  son-in-law, making sure that we understand each other.  On the whole, improved compliance, and so far, improved outcomes.    Plans as above.  Return in 2 months.  40 minutes please.  Medicare Wellness evaluation.  Consider repeat FASTING laboratory examinations if not yet done.  Coordinate with nephrology, endocrinology, podiatry.  I will await word regarding the report of a foreign body in his right foot found by Dr. Collins.            0

## 2023-06-02 NOTE — PATIENT INSTRUCTIONS
Thank you very much for coming.  I am very happy to see you.    I am glad to hear that you did very well with the help of Dr. Collins, PODIATRY!  I will await word from her, and I will coordinate with her recommendations.  I would like to examine your foot again sometime soon.  If you notice any new changes, please let Dr. Collins or let me know.    I do understand that you will see Dr. Jameson, ENDOCRINOLOGY, next month.  I am eager to hear what he has to say.  In the meantime, your blood sugar marker, hemoglobin A1c, has been better at 7.7!  The goal is 6.5 or less.  You are on your way.  Please continue to take your medications, and please continue to eat sensibly, avoiding sugar and starch.  Please continue to stay physically active.  I do appreciate your efforts.  Everything is going in the right direction!    Likewise, please continue seeing Dr. Patel, NEPHROLOGY.  Your blood pressure is getting better and is close to being ideal.  We would like the first number of your blood pressure to be 135 or less.  Continue taking your current medications.  Drink lots of fluids.  Avoid salt.  Stay physically active.  All of these contribute to better blood pressure readings.    I would like to see you in 2 months to recheck everything, and to do your Medicare Wellness visit.  I may need to repeat some lab work, but let us wait and see what the other doctors order first.    Again, thank you very much for coming.  Please come back in 2 months.  Until then, please continue to take care of yourself and each other, and please continue to pray for our recovery from this pandemic.  Advanced happy Father's Day to all of us!            0  Here with son-in-law, making sure that we understand each other.  On the whole, improved compliance, and so far, improved outcomes.    Plans as above.  Return in 2 months.  40 minutes please.  Medicare Wellness evaluation.  Consider repeat FASTING laboratory examinations if not yet done.  Coordinate  with nephrology, endocrinology, podiatry.  I will await word regarding the report of a foreign body in his right foot found by Dr. Collins.            0

## 2023-06-09 ENCOUNTER — PATIENT OUTREACH (OUTPATIENT)
Dept: CARE COORDINATION | Facility: CLINIC | Age: 68
End: 2023-06-09
Payer: COMMERCIAL

## 2023-06-09 NOTE — PROGRESS NOTES
Call placed regarding one month post discharge follow up call.  At time of outreach call the patient's son-in-law feels as if his condition has improved since initial visit with PCP or specialist. Questions or concerns regarding recovery period addressed at this time. Reviewed any PCP or specialists progress notes/labs/radiology reports if applicable and addressed any questions or concerns.

## 2023-08-04 ENCOUNTER — OFFICE VISIT (OUTPATIENT)
Dept: PRIMARY CARE | Facility: CLINIC | Age: 68
End: 2023-08-04
Payer: MEDICARE

## 2023-08-04 VITALS
SYSTOLIC BLOOD PRESSURE: 152 MMHG | HEIGHT: 71 IN | DIASTOLIC BLOOD PRESSURE: 88 MMHG | WEIGHT: 226 LBS | HEART RATE: 82 BPM | OXYGEN SATURATION: 95 % | BODY MASS INDEX: 31.64 KG/M2

## 2023-08-04 DIAGNOSIS — I10 ESSENTIAL HYPERTENSION: ICD-10-CM

## 2023-08-04 DIAGNOSIS — N18.31 STAGE 3A CHRONIC KIDNEY DISEASE (MULTI): ICD-10-CM

## 2023-08-04 DIAGNOSIS — D63.8 ANEMIA, CHRONIC DISEASE: ICD-10-CM

## 2023-08-04 DIAGNOSIS — Z00.00 MEDICARE ANNUAL WELLNESS VISIT, SUBSEQUENT: ICD-10-CM

## 2023-08-04 DIAGNOSIS — Z11.59 ENCOUNTER FOR HEPATITIS C SCREENING TEST FOR LOW RISK PATIENT: ICD-10-CM

## 2023-08-04 DIAGNOSIS — E11.22 TYPE 2 DIABETES MELLITUS WITH STAGE 3A CHRONIC KIDNEY DISEASE, WITH LONG-TERM CURRENT USE OF INSULIN (MULTI): ICD-10-CM

## 2023-08-04 DIAGNOSIS — E55.9 VITAMIN D DEFICIENCY: ICD-10-CM

## 2023-08-04 DIAGNOSIS — Z79.4 TYPE 2 DIABETES MELLITUS WITH STAGE 3A CHRONIC KIDNEY DISEASE, WITH LONG-TERM CURRENT USE OF INSULIN (MULTI): ICD-10-CM

## 2023-08-04 DIAGNOSIS — E78.2 MIXED HYPERLIPIDEMIA: ICD-10-CM

## 2023-08-04 DIAGNOSIS — N18.31 TYPE 2 DIABETES MELLITUS WITH STAGE 3A CHRONIC KIDNEY DISEASE, WITH LONG-TERM CURRENT USE OF INSULIN (MULTI): ICD-10-CM

## 2023-08-04 DIAGNOSIS — Z00.00 ROUTINE GENERAL MEDICAL EXAMINATION AT HEALTH CARE FACILITY: Primary | ICD-10-CM

## 2023-08-04 DIAGNOSIS — E79.0 HYPERURICEMIA: ICD-10-CM

## 2023-08-04 PROCEDURE — G0439 PPPS, SUBSEQ VISIT: HCPCS | Performed by: INTERNAL MEDICINE

## 2023-08-04 PROCEDURE — 1170F FXNL STATUS ASSESSED: CPT | Performed by: INTERNAL MEDICINE

## 2023-08-04 PROCEDURE — 3066F NEPHROPATHY DOC TX: CPT | Performed by: INTERNAL MEDICINE

## 2023-08-04 PROCEDURE — 1123F ACP DISCUSS/DSCN MKR DOCD: CPT | Performed by: INTERNAL MEDICINE

## 2023-08-04 PROCEDURE — 99213 OFFICE O/P EST LOW 20 MIN: CPT | Performed by: INTERNAL MEDICINE

## 2023-08-04 PROCEDURE — 3051F HG A1C>EQUAL 7.0%<8.0%: CPT | Performed by: INTERNAL MEDICINE

## 2023-08-04 PROCEDURE — 1160F RVW MEDS BY RX/DR IN RCRD: CPT | Performed by: INTERNAL MEDICINE

## 2023-08-04 PROCEDURE — 1036F TOBACCO NON-USER: CPT | Performed by: INTERNAL MEDICINE

## 2023-08-04 PROCEDURE — 1126F AMNT PAIN NOTED NONE PRSNT: CPT | Performed by: INTERNAL MEDICINE

## 2023-08-04 PROCEDURE — 3079F DIAST BP 80-89 MM HG: CPT | Performed by: INTERNAL MEDICINE

## 2023-08-04 PROCEDURE — 4010F ACE/ARB THERAPY RXD/TAKEN: CPT | Performed by: INTERNAL MEDICINE

## 2023-08-04 PROCEDURE — 1159F MED LIST DOCD IN RCRD: CPT | Performed by: INTERNAL MEDICINE

## 2023-08-04 PROCEDURE — 3077F SYST BP >= 140 MM HG: CPT | Performed by: INTERNAL MEDICINE

## 2023-08-04 RX ORDER — LISINOPRIL 20 MG/1
TABLET ORAL
Qty: 180 TABLET | Refills: 0 | Status: SHIPPED | OUTPATIENT
Start: 2023-08-04 | End: 2024-05-17 | Stop reason: SDUPTHER

## 2023-08-04 ASSESSMENT — ACTIVITIES OF DAILY LIVING (ADL)
DRESSING: INDEPENDENT
MANAGING_FINANCES: INDEPENDENT
DOING_HOUSEWORK: INDEPENDENT
TAKING_MEDICATION: INDEPENDENT
GROCERY_SHOPPING: INDEPENDENT
BATHING: INDEPENDENT

## 2023-08-04 ASSESSMENT — ENCOUNTER SYMPTOMS
LOSS OF SENSATION IN FEET: 0
OCCASIONAL FEELINGS OF UNSTEADINESS: 0
DEPRESSION: 0

## 2023-08-04 ASSESSMENT — PATIENT HEALTH QUESTIONNAIRE - PHQ9
SUM OF ALL RESPONSES TO PHQ9 QUESTIONS 1 AND 2: 1
2. FEELING DOWN, DEPRESSED OR HOPELESS: SEVERAL DAYS
10. IF YOU CHECKED OFF ANY PROBLEMS, HOW DIFFICULT HAVE THESE PROBLEMS MADE IT FOR YOU TO DO YOUR WORK, TAKE CARE OF THINGS AT HOME, OR GET ALONG WITH OTHER PEOPLE: NOT DIFFICULT AT ALL
1. LITTLE INTEREST OR PLEASURE IN DOING THINGS: NOT AT ALL

## 2023-08-04 NOTE — PATIENT INSTRUCTIONS
Thank you very much for coming.  I am very happy to see you.    I am glad to see that you follow closely with ENDOCRINOLOGY, Dr. Jameson.  I will await further recommendations from him.  In the meantime, I saw that he checked some blood examinations, and the results were stable.    Your blood pressure is a little elevated today.  I do understand that this is similar to what you have been getting at home.  I reviewed your medications.  Continue taking her amlodipine, CHLORTHALIDONE, hydrochlorothiazide, isosorbide, and metoprolol.  Please INCREASE LISINOPRIL to 20 mg, now take TWO TABLETS please with supper, starting tonight.    Please continue to drink lots of fluids, and please continue to avoid salt.  Good for blood pressure control, and good for keeping your kidneys healthy.  Please continue to stay physically active, but please watch out for falling.    Again, thank you very much for coming.  We reviewed your LIVING WILL wishes, and we will keep your SON-IN-LAW, Sotero Head, as your DURABLE POWER OF  for healthcare matters.  Your daughter will be number 2!  I do understand that if you do get sick, we can call 911, have EMS pick you up, and do CPR, and if needed, put a tube down your throat to help you breathe.  As such, your CODE STATUS is FULL CODE.  We will of course try her best to maintain your quality of life in the process.    Again, thank you very much for coming.  Please do not hesitate to call with questions or concerns.  Please check your blood pressure every evening, and if you continue to have elevated blood pressure, if the first number remains 140 or higher, or if the second number is ever 90 or higher, please let me know.    Likewise, please let me know if you are running low on your medications, including LISINOPRIL, which I just reordered.    You will benefit from VACCINATIONS.  Get yourself vaccinated for PNEUMONIA with Prevnar 20.  Very important.  After 2 weeks, schedule yourself for  the SHINGLES vaccination, Shingrix.  This comes in 2 injections at least 1 month apart.    Please do not take more than 1 vaccination per visit.  Space your vaccinations apart at least 2 weeks please.    Please come back in 4 months, DECEMBER.  It will be time for your COMPLETE physical examination for the year.  Please do FASTING laboratory examinations via [facility] or [facility]/ a few days prior.  The orders will be they are waiting for you.    Please continue to take care of yourself and your family, and please continue to pray for our recovery from this pandemic.  See you in 4 months.            0  Return in 4 months.  40 minutes please.  FASTING laboratory examinations outside, then see me for COMPLETE physical examination for the year.  Coordinate with endocrinology, podiatry, ophthalmology.  Coordinate with nephrology, reassess hemodynamics.  Prepare for winter.  Review preventive strategies.            0

## 2023-08-04 NOTE — PROGRESS NOTES
Subjective   Reason for Visit: Markos Interiano is an 67 y.o. male here for a Medicare Wellness visit.     Past Medical, Surgical, and Family History reviewed and updated in chart.    Reviewed all medications by prescribing practitioner or clinical pharmacist (such as prescriptions, OTCs, herbal therapies and supplements) and documented in the medical record.    HPI  The patient is here for his yearly Medicare Wellness evaluation, and to allow me to catch up on interval history, compliance, tolerance.  Following closely with ENDOCRINOLOGY.  Seemingly compliant with regimens.  ENDOCRINE with no polyuria, polydipsia, polyphagia.  No blurred vision.  No skin, hair, nail changes.  No dramatic weight loss or weight gain.      Likewise, followed closely by PODIATRY, history of cellulitis, diabetes.  No new skin changes, rashes, pruritus, jaundice.  No easy bruisability.  CONSTITUTIONALLY, no fever, no chills.  No night sweats.  No lingering anorexia or nausea.  No apparent lymphadenopathy.  No apparent weight loss.    Likewise, with history of hypertension, some renal insufficiency, being followed by NEPHROLOGY.  No lingering anorexia or nausea.  No easy bruisability or jaundice, as above.  Likewise, no lethargy, irritability, and no change in urine character or habits.  No dysuria, flank, suprapubic pain.  No symptoms referable to uremia.  The patient has been keeping up with fluids.    Physically active.  No landon substernal chest pain.  No orthopnea, no paroxysmal nocturnal dyspnea.  Careful about exposure.  No coughing, no sputum production.  Likewise, has remained motivated to take care of self.  No headache, blurred vision, diplopia.  No dysphagia.  Does not wish harm to self or others.    LIVING WILL wishes and CODE STATUS reviewed, below.    Patient Care Team:  Bill Mendieta MD as PCP - General  Bill Mendieta MD as PCP - [employer] PCP         Review of Systems  Review of  "systems as in history of present illness, and otherwise, reviewed separately as well, and was unremarkable/negative/noncontributory.          Objective   Vitals:  /88   Pulse 82   Ht 1.803 m (5' 11\")   Wt 103 kg (226 lb)   SpO2 95%   BMI 31.52 kg/m²       Physical Exam  In otherwise good spirits.  Not in distress or diaphoresis.  Alert, oriented x3.  Amiable.  Not unkempt.  Receptive.  Appropriate.  Moderately built.  Not cachectic.  Does want to improve quality of life and remain healthy and independent.  Does not wish harm to self or others.    HEAD pink palpebral conjunctivae, anicteric sclerae.  NECK supple, no apparent jugular venous distention.  CARDIOVASCULAR not in distress or diaphoresis.  No bipedal edema.  LUNGS not in distress or diaphoresis.  Not using accessory muscles.  ABDOMEN soft, nontender.  BACK no costovertebral angle tenderness.  EXTREMITIES no clubbing, no cyanosis.  NEURO no facial asymmetry.  No apparent cranial nerve deficits.  Romberg negative.  Ambulating without need of assistance.  No apparent focal weakness.  No tremors.  PSYCH receptive, appropriate, and eager to maintain and improve quality of life.      LABORATORY examinations reviewed with patient and daughter, who is providing  services.  Remarkably, patient more verbose now than he usually is when he is instead of accompanied by his son-in-law.        Assessment/Plan   Problem List Items Addressed This Visit       Anemia, chronic disease    Overview     Cologuard 2022 NEGATIVE         Relevant Orders    CBC and Auto Differential    Urinalysis with Reflex Microscopic and Culture    Vitamin B12    Folate    Ferritin    Iron and TIBC    Essential hypertension    Relevant Medications    lisinopril 20 mg tablet    Other Relevant Orders    CBC and Auto Differential    Urinalysis with Reflex Microscopic and Culture    Comprehensive Metabolic Panel    TSH with reflex to Free T4 if abnormal    Magnesium    " Hyperuricemia    Relevant Orders    Uric Acid    Mixed hyperlipidemia    Relevant Orders    Comprehensive Metabolic Panel    Lipid Panel    Stage 3a chronic kidney disease (CMS/HCC)    Relevant Medications    lisinopril 20 mg tablet    Other Relevant Orders    CBC and Auto Differential    Comprehensive Metabolic Panel    Albumin , Urine Random    Magnesium    Uric Acid    Ferritin    Type 2 diabetes mellitus with stage 3a chronic kidney disease, with long-term current use of insulin (CMS/HCC)    Relevant Medications    lisinopril 20 mg tablet    Other Relevant Orders    Urinalysis with Reflex Microscopic and Culture    Comprehensive Metabolic Panel    Hemoglobin A1C    Albumin , Urine Random    Vitamin D deficiency    Relevant Orders    Vitamin D 25-Hydroxy,Total     Other Visit Diagnoses       Routine general medical examination at [facility]    -  Primary Medicare annual wellness visit, subsequent        Encounter for hepatitis C screening test for low risk patient        Relevant Orders    Hepatitis C antibody               Thank you very much for coming.  I am very happy to see you.    I am glad to see that you follow closely with ENDOCRINOLOGY, Dr. Jameson.  I will await further recommendations from him.  In the meantime, I saw that he checked some blood examinations, and the results were stable.    Your blood pressure is a little elevated today.  I do understand that this is similar to what you have been getting at home.  I reviewed your medications.  Continue taking her amlodipine, CHLORTHALIDONE, hydrochlorothiazide, isosorbide, and metoprolol.  Please INCREASE LISINOPRIL to 20 mg, now take TWO TABLETS please with supper, starting tonight.    Please continue to drink lots of fluids, and please continue to avoid salt.  Good for blood pressure control, and good for keeping your kidneys healthy.  Please continue to stay physically active, but please watch out for falling.    Again, thank you very much  for coming.  We reviewed your LIVING WILL wishes, and we will keep your SON-IN-LAW, [place]ero Head, as your DURABLE POWER OF  for healthcare matters.  Your daughter will be number 2!  I do understand that if you do get sick, we can call 911, have EMS pick you up, and do CPR, and if needed, put a tube down your throat to help you breathe.  As such, your CODE STATUS is FULL CODE.  We will of course try her best to maintain your quality of life in the process.    Again, thank you very much for coming.  Please do not hesitate to call with questions or concerns.  Please check your blood pressure every evening, and if you continue to have elevated blood pressure, if the first number remains 140 or higher, or if the second number is ever 90 or higher, please let me know.    Likewise, please let me know if you are running low on your medications, including LISINOPRIL, which I just reordered.    You will benefit from VACCINATIONS.  Get yourself vaccinated for PNEUMONIA with Prevnar 20.  Very important.  After 2 weeks, schedule yourself for the SHINGLES vaccination, Shingrix.  This comes in 2 injections at least 1 month apart.    Please do not take more than 1 vaccination per visit.  Space your vaccinations apart at least 2 weeks please.    Please come back in 4 months, DECEMBER.  It will be time for your COMPLETE physical examination for the year.  Please do FASTING laboratory examinations via [facility] or [facility]/ a few days prior.  The orders will be they are waiting for you.    Please continue to take care of yourself and your family, and please continue to pray for our recovery from this pandemic.  See you in 4 months.            0  Return in 4 months.  40 minutes please.  FASTING laboratory examinations outside, then see me for COMPLETE physical examination for the year.  Coordinate with endocrinology, podiatry, ophthalmology.  Coordinate with nephrology, reassess hemodynamics.  Prepare for winter.  Review  preventive strategies.            0

## 2023-12-08 ENCOUNTER — OFFICE VISIT (OUTPATIENT)
Dept: PRIMARY CARE | Facility: CLINIC | Age: 68
End: 2023-12-08
Payer: MEDICARE

## 2023-12-08 VITALS
WEIGHT: 226 LBS | SYSTOLIC BLOOD PRESSURE: 156 MMHG | BODY MASS INDEX: 31.64 KG/M2 | DIASTOLIC BLOOD PRESSURE: 82 MMHG | OXYGEN SATURATION: 95 % | HEIGHT: 71 IN | RESPIRATION RATE: 20 BRPM | HEART RATE: 73 BPM

## 2023-12-08 DIAGNOSIS — Z91.199 NONCOMPLIANCE: ICD-10-CM

## 2023-12-08 DIAGNOSIS — I10 ESSENTIAL HYPERTENSION: ICD-10-CM

## 2023-12-08 DIAGNOSIS — R26.0 ATAXIC GAIT: ICD-10-CM

## 2023-12-08 DIAGNOSIS — I10 HYPERTENSION, UNCONTROLLED: Primary | ICD-10-CM

## 2023-12-08 PROCEDURE — 90662 IIV NO PRSV INCREASED AG IM: CPT | Performed by: INTERNAL MEDICINE

## 2023-12-08 PROCEDURE — 3079F DIAST BP 80-89 MM HG: CPT | Performed by: INTERNAL MEDICINE

## 2023-12-08 PROCEDURE — 4010F ACE/ARB THERAPY RXD/TAKEN: CPT | Performed by: INTERNAL MEDICINE

## 2023-12-08 PROCEDURE — 1126F AMNT PAIN NOTED NONE PRSNT: CPT | Performed by: INTERNAL MEDICINE

## 2023-12-08 PROCEDURE — 99213 OFFICE O/P EST LOW 20 MIN: CPT | Performed by: INTERNAL MEDICINE

## 2023-12-08 PROCEDURE — 1159F MED LIST DOCD IN RCRD: CPT | Performed by: INTERNAL MEDICINE

## 2023-12-08 PROCEDURE — 3077F SYST BP >= 140 MM HG: CPT | Performed by: INTERNAL MEDICINE

## 2023-12-08 PROCEDURE — 1036F TOBACCO NON-USER: CPT | Performed by: INTERNAL MEDICINE

## 2023-12-08 PROCEDURE — 3051F HG A1C>EQUAL 7.0%<8.0%: CPT | Performed by: INTERNAL MEDICINE

## 2023-12-08 PROCEDURE — G0008 ADMIN INFLUENZA VIRUS VAC: HCPCS | Performed by: INTERNAL MEDICINE

## 2023-12-08 PROCEDURE — 3066F NEPHROPATHY DOC TX: CPT | Performed by: INTERNAL MEDICINE

## 2023-12-08 PROCEDURE — 1160F RVW MEDS BY RX/DR IN RCRD: CPT | Performed by: INTERNAL MEDICINE

## 2023-12-08 NOTE — PATIENT INSTRUCTIONS
Thank you very much for coming.  I am very happy to see you.    You have yet to do your FASTING laboratory determinations.  Have them done tomorrow.  [facility] will be open from 6:30 in the morning up to noon time.  Please do FASTING blood examination then.  You can also have it done on the weekdays.  They are usually open 6:30 in the morning until 5:00 in the afternoon.    When I see the laboratory results, I will send word regarding any possible changes.    In the meantime, please make sure to check your blood pressure in the evening, in a calm and resting state.  Seated, with your feet flat on the floor, and using the backrest of the chair.    Please call me if your blood pressure remains elevated, if the first number is 145 or higher, or if the second number is 88 or higher.    You need to be seen by your EYE SPECIALIST at least once a year.  Please schedule an appointment before the end of the year, very important.    Please make sure you are keeping your appointment with Dr. Patel, NEPHROLOGY.  He is the expert in keeping kidneys healthy, and controlling your blood pressure.    Again, thank you very much for coming.  Drink lots of fluids.  Avoid salt.  Good for blood pressure control.  Stay physically active.  Come back in 2 weeks, so that we can make sure that your blood pressure is controlled.  Until then, please continue to take care of yourself and each other, and please continue to pray for our recovery from this pandemic.    With your history of frequent falling, I do understand that you would like me to fill out paperwork for a new apartment.  I will do that if you promised to do my lab work, and if you promised to check your blood pressure and come back in 2 weeks!  I will definitely take care of your paperwork.  I hope you have a good [place] season.            0  Return in 2 weeks.  20 minutes please.  Reassess hemodynamics, cardiovascular risk, compliance, tolerance, mood, energy,  function, rule out self-neglect, and caregiver stress of son-in-law.            0

## 2023-12-08 NOTE — PROGRESS NOTES
"Subjective   Patient ID: Markos Interiano is a 67 y.o. male who presents for Annual Exam.    HPI   The patient does not recall his medications, but states that he has been taking his regimens regularly as recommended by Dr. Patel, NEPHROLOGY, as well as Dr. Jameson, ENDOCRINOLOGY.  States that he has been checking his blood pressure, but he does not recall the systolic blood pressure, only the diastolic blood pressure in the 70s and 80s, he states.  Son-in-law in attendance, trying to make sure that we understand each other, and translating from English to Uzbek and vice versa.    Again, the patient states that he has been following directions.  The son-in-law states that he does not think that he is completely compliant.  In the meantime, denies chest pain, palpitations, orthopnea, paroxysmal nocturnal dyspnea.  No headache, blurred vision, diplopia.  No dysphagia.  Not yet seen by OPHTHALMOLOGY, but seen by PODIATRY, and has received orthotics.  ENDOCRINE with no polyuria, polydipsia, polyphagia.  No blurred vision.  No skin, hair, nail changes.  No dramatic weight loss or weight gain.      Appetite preserved, no abdominal distress.  No dysuria, flank, suprapubic pain.  No particular skin changes.  Continues to want to stay healthy, independent, and productive, does not wish harm to self or others.  Trying to get into a better apartment, with son-in-law stating that he has had some ataxia and falls, and has many dogs, and would like a more suitable place.        Review of Systems  Review of systems as in history of present illness, and otherwise, reviewed separately as well, and was unremarkable/negative/noncontributory.          Objective   /82 (BP Location: Left arm)   Pulse 73   Resp 20   Ht 1.803 m (5' 11\")   Wt 103 kg (226 lb)   SpO2 95%   BMI 31.52 kg/m²     Physical Exam  In otherwise good spirits.  Not in distress or diaphoresis.  Receptive, oriented x 3.  Amiable.  Not unkempt.  Does " want to improve quality of life, and does not want to harm self or others.  Seemingly appropriate.  Moderately obese build, but capable.    HEAD pink palpebral conjunctivae, anicteric sclerae.  NECK supple, no apparent jugular venous distention.  CARDIOVASCULAR not in distress or diaphoresis.  No bipedal edema.  LUNGS not in distress or diaphoresis.  Not using accessory muscles.  ABDOMEN soft, nontender.  BACK no costovertebral angle tenderness.  EXTREMITIES no clubbing, no cyanosis.  NEURO no facial asymmetry.  No apparent cranial nerve deficits.  Romberg negative.  Ambulating without need of assistance.  No apparent focal weakness.  No tremors.  PSYCH receptive, appropriate, and eager to maintain and improve quality of life.      LABORATORY results reviewed with patient.  Need updating as discussed.      Assessment/Plan   Diagnoses and all orders for this visit:  Hypertension, uncontrolled  -     Follow Up In Primary Care - Established; Future  -     Flu vaccine, quadrivalent, high-dose, preservative free, age 65y+ (FLUZONE)  Essential hypertension  -     Follow Up In Primary Care - Established; Future  -     Flu vaccine, quadrivalent, high-dose, preservative free, age 65y+ (FLUZONE)  Noncompliance  -     Follow Up In Primary Care - Established; Future  -     Flu vaccine, quadrivalent, high-dose, preservative free, age 65y+ (FLUZONE)  Ataxic gait  -     Follow Up In Primary Care - Established; Future  -     Flu vaccine, quadrivalent, high-dose, preservative free, age 65y+ (FLUZONE)  Other orders  -     Follow Up In Primary Care - Established       Thank you very much for coming.  I am very happy to see you.    You have yet to do your FASTING laboratory determinations.  Have them done tomorrow.  [facility] will be open from 6:30 in the morning up to noon time.  Please do FASTING blood examination then.  You can also have it done on the weekdays.  They are usually open 6:30 in the morning until 5:00 in the  afternoon.    When I see the laboratory results, I will send word regarding any possible changes.    In the meantime, please make sure to check your blood pressure in the evening, in a calm and resting state.  Seated, with your feet flat on the floor, and using the backrest of the chair.    Please call me if your blood pressure remains elevated, if the first number is 145 or higher, or if the second number is 88 or higher.    You need to be seen by your EYE SPECIALIST at least once a year.  Please schedule an appointment before the end of the year, very important.    Please make sure you are keeping your appointment with Dr. Patel, NEPHROLOGY.  He is the expert in keeping kidneys healthy, and controlling your blood pressure.    Again, thank you very much for coming.  Drink lots of fluids.  Avoid salt.  Good for blood pressure control.  Stay physically active.  Come back in 2 weeks, so that we can make sure that your blood pressure is controlled.  Until then, please continue to take care of yourself and each other, and please continue to pray for our recovery from this pandemic.    With your history of frequent falling, I do understand that you would like me to fill out paperwork for a new apartment.  I will do that if you promised to do my lab work, and if you promised to check your blood pressure and come back in 2 weeks!  I will definitely take care of your paperwork.  I hope you have a good [place] season.            0  Return in 2 weeks.  20 minutes please.  Reassess hemodynamics, cardiovascular risk, compliance, tolerance, mood, energy, function, rule out self-neglect, and caregiver stress of son-in-law.            0

## 2023-12-22 ENCOUNTER — OFFICE VISIT (OUTPATIENT)
Dept: PRIMARY CARE | Facility: CLINIC | Age: 68
End: 2023-12-22
Payer: MEDICARE

## 2023-12-22 VITALS
WEIGHT: 226 LBS | DIASTOLIC BLOOD PRESSURE: 80 MMHG | HEART RATE: 77 BPM | BODY MASS INDEX: 31.64 KG/M2 | SYSTOLIC BLOOD PRESSURE: 148 MMHG | HEIGHT: 71 IN | OXYGEN SATURATION: 94 %

## 2023-12-22 DIAGNOSIS — N18.31 TYPE 2 DIABETES MELLITUS WITH STAGE 3A CHRONIC KIDNEY DISEASE, WITH LONG-TERM CURRENT USE OF INSULIN (MULTI): ICD-10-CM

## 2023-12-22 DIAGNOSIS — Z91.199 NONCOMPLIANCE: ICD-10-CM

## 2023-12-22 DIAGNOSIS — Z79.4 TYPE 2 DIABETES MELLITUS WITH STAGE 3A CHRONIC KIDNEY DISEASE, WITH LONG-TERM CURRENT USE OF INSULIN (MULTI): ICD-10-CM

## 2023-12-22 DIAGNOSIS — N18.31 STAGE 3A CHRONIC KIDNEY DISEASE (MULTI): ICD-10-CM

## 2023-12-22 DIAGNOSIS — E78.2 MIXED HYPERLIPIDEMIA: ICD-10-CM

## 2023-12-22 DIAGNOSIS — I10 ESSENTIAL HYPERTENSION: Primary | ICD-10-CM

## 2023-12-22 DIAGNOSIS — R26.0 ATAXIC GAIT: ICD-10-CM

## 2023-12-22 DIAGNOSIS — E11.22 TYPE 2 DIABETES MELLITUS WITH STAGE 3A CHRONIC KIDNEY DISEASE, WITH LONG-TERM CURRENT USE OF INSULIN (MULTI): ICD-10-CM

## 2023-12-22 PROCEDURE — 1126F AMNT PAIN NOTED NONE PRSNT: CPT | Performed by: INTERNAL MEDICINE

## 2023-12-22 PROCEDURE — 4010F ACE/ARB THERAPY RXD/TAKEN: CPT | Performed by: INTERNAL MEDICINE

## 2023-12-22 PROCEDURE — 3079F DIAST BP 80-89 MM HG: CPT | Performed by: INTERNAL MEDICINE

## 2023-12-22 PROCEDURE — 1159F MED LIST DOCD IN RCRD: CPT | Performed by: INTERNAL MEDICINE

## 2023-12-22 PROCEDURE — 3066F NEPHROPATHY DOC TX: CPT | Performed by: INTERNAL MEDICINE

## 2023-12-22 PROCEDURE — 3077F SYST BP >= 140 MM HG: CPT | Performed by: INTERNAL MEDICINE

## 2023-12-22 PROCEDURE — 3051F HG A1C>EQUAL 7.0%<8.0%: CPT | Performed by: INTERNAL MEDICINE

## 2023-12-22 PROCEDURE — 1160F RVW MEDS BY RX/DR IN RCRD: CPT | Performed by: INTERNAL MEDICINE

## 2023-12-22 PROCEDURE — 99213 OFFICE O/P EST LOW 20 MIN: CPT | Performed by: INTERNAL MEDICINE

## 2023-12-22 PROCEDURE — 1036F TOBACCO NON-USER: CPT | Performed by: INTERNAL MEDICINE

## 2023-12-22 NOTE — PATIENT INSTRUCTIONS
Please remember that you have to do your FASTING laboratory examinations anytime soon.  You were supposed to do it December 9.  It will help us determine what changes need to be done regarding your blood pressure control.    Drink lots of fluids throughout the day.  Avoid salt.  Stay physically active, especially with lots of walking.  Please check your blood pressure in the evening, in a calm and resting state.  Sit down in a chair, feet flat on the floor, and use the backrest of the chair.  Please call me if your blood pressure remains 145 or higher for the first number, or if it is 85 or higher for the second number.    Remember, I will write your letter for your apartment IF you get to do your laboratory results.  When I see the results, that we will remind me to write you a letter.    Please continue taking your medications, amlodipine, chlorthalidone, hydralazine, isosorbide, lisinopril, metoprolol.  Please continue seeing Dr. Patel, NEPHROLOGY.  He is very good at controlling blood pressure.  Please keep your appointments with ophthalmology, nephrology, podiatry, endocrinology.    I hope you have a good [place], and a happy new year!  I will see you again in 3 weeks to make sure that you are able to keep up with our plans!            0  Return in 3 weeks.  20 minutes please.  Reassess compliance, tolerance, ataxia, blood pressure control.  Coordinate with hopefully ophthalmology, nephrology, endocrinology.            0

## 2023-12-22 NOTE — PROGRESS NOTES
"Subjective   Patient ID: Markos Interiano is a 67 y.o. male who presents for Follow-up.    HPI   The patient states that he has been keeping up with medications, and that blood pressure has been running 140s systolically, 80s diastolically.  Has yet to do FASTING laboratory examinations.  In the meantime, denies chest pain, palpitations, orthopnea, paroxysmal nocturnal dyspnea, and points out that he feels great, and that he feels that he is doing well.  No headache, blurred vision, diplopia.  No dysphagia.  No particular cough, no particular sputum production.  No dysuria, flank, suprapubic pain.  Appetite preserved, no abdominal distress.  No particular skin changes.  CONSTITUTIONALLY, no fever, no chills.  No night sweats.  No lingering anorexia or nausea.  No apparent lymphadenopathy.  No apparent weight loss.    Wondering if I can write him a letter for accommodations for a new apartment, perhaps something on only 1 floor.  Again, no recent falls.  Follows with podiatry, endocrinology.        Review of Systems  Review of systems as in history of present illness, and otherwise, reviewed separately as well, and was unremarkable/negative/noncontributory.          Objective   /80 (BP Location: Left arm, Patient Position: Sitting)   Pulse 77   Ht 1.803 m (5' 11\")   Wt 103 kg (226 lb)   SpO2 94%   BMI 31.52 kg/m²     Physical Exam  Not in distress or diaphoresis.  Alert, oriented x 3.  Amiable.  Not unkempt.  Receptive, cheerful, appropriate.  Eager to improve quality of life, and promises to do his fasting laboratory examinations soon.  Son-in-law in attendance, making sure that we understand each other.  Moderately built.  Remaining appropriate.    HEAD pink palpebral conjunctivae, anicteric sclerae.  NECK supple, no apparent jugular venous distention.  CARDIOVASCULAR not in distress or diaphoresis.  No bipedal edema.  LUNGS not in distress or diaphoresis.  Not using accessory muscles.  ABDOMEN " soft, nontender.  BACK no costovertebral angle tenderness.  EXTREMITIES no clubbing, no cyanosis.  NEURO no facial asymmetry.  No apparent cranial nerve deficits.  Romberg negative.  Ambulating without need of assistance.  No apparent focal weakness.  No tremors.  PSYCH receptive, appropriate, and eager to maintain and improve quality of life.      LABORATORY examinations need updating.  Patient understands.      Assessment/Plan   Diagnoses and all orders for this visit:  Essential hypertension  -     Follow Up In Primary Care - Established  -     Follow Up In Primary Care - [facility]; Future  Noncompliance  -     Follow Up In Primary Care - Established  -     Follow Up In Primary Care - [facility]; Future  Stage 3a chronic kidney disease (CMS/HCC)  -     Follow Up In Primary Care - [facility]; Future  Type 2 diabetes mellitus with stage 3a chronic kidney disease, with long-term current use of insulin (CMS/[facility])  -     Follow Up In Primary Care - Established; Future  Mixed hyperlipidemia  -     Follow Up In Primary Care - Established; Future  Ataxic gait  -     Follow Up In Primary Care - Established; Future       Please remember that you have to do your FASTING laboratory examinations anytime soon.  You were supposed to do it December 9.  It will help us determine what changes need to be done regarding your blood pressure control.    Drink lots of fluids throughout the day.  Avoid salt.  Stay physically active, especially with lots of walking.  Please check your blood pressure in the evening, in a calm and resting state.  Sit down in a chair, feet flat on the floor, and use the backrest of the chair.  Please call me if your blood pressure remains 145 or higher for the first number, or if it is 85 or higher for the second number.    Remember, I will write your letter for your apartment IF you get to do your laboratory results.  When I see the results, that we will remind me to write you a letter.    Please  continue taking your medications, amlodipine, chlorthalidone, hydralazine, isosorbide, lisinopril, metoprolol.  Please continue seeing Dr. Patel, NEPHROLOGY.  He is very good at controlling blood pressure.  Please keep your appointments with ophthalmology, nephrology, podiatry, endocrinology.    I hope you have a good [place], and a happy new year!  I will see you again in 3 weeks to make sure that you are able to keep up with our plans!            0  Return in 3 weeks.  20 minutes please.  Reassess compliance, tolerance, ataxia, blood pressure control.  Coordinate with hopefully ophthalmology, nephrology, endocrinology.            0

## 2024-01-18 ENCOUNTER — OFFICE VISIT (OUTPATIENT)
Dept: PRIMARY CARE | Facility: CLINIC | Age: 69
End: 2024-01-18
Payer: MEDICARE

## 2024-01-18 VITALS
HEART RATE: 90 BPM | SYSTOLIC BLOOD PRESSURE: 148 MMHG | RESPIRATION RATE: 20 BRPM | HEIGHT: 71 IN | BODY MASS INDEX: 28.7 KG/M2 | DIASTOLIC BLOOD PRESSURE: 86 MMHG | WEIGHT: 205 LBS | OXYGEN SATURATION: 96 %

## 2024-01-18 DIAGNOSIS — Z79.4 TYPE 2 DIABETES MELLITUS WITH STAGE 3A CHRONIC KIDNEY DISEASE, WITH LONG-TERM CURRENT USE OF INSULIN (MULTI): ICD-10-CM

## 2024-01-18 DIAGNOSIS — E78.2 MIXED HYPERLIPIDEMIA: ICD-10-CM

## 2024-01-18 DIAGNOSIS — E11.22 TYPE 2 DIABETES MELLITUS WITH STAGE 3A CHRONIC KIDNEY DISEASE, WITH LONG-TERM CURRENT USE OF INSULIN (MULTI): ICD-10-CM

## 2024-01-18 DIAGNOSIS — N18.31 TYPE 2 DIABETES MELLITUS WITH STAGE 3A CHRONIC KIDNEY DISEASE, WITH LONG-TERM CURRENT USE OF INSULIN (MULTI): ICD-10-CM

## 2024-01-18 DIAGNOSIS — Z91.199 NONCOMPLIANCE: ICD-10-CM

## 2024-01-18 DIAGNOSIS — N18.31 STAGE 3A CHRONIC KIDNEY DISEASE (MULTI): ICD-10-CM

## 2024-01-18 DIAGNOSIS — I10 ESSENTIAL HYPERTENSION: ICD-10-CM

## 2024-01-18 PROBLEM — L97.509 DIABETIC FOOT ULCER (MULTI): Status: RESOLVED | Noted: 2023-04-21 | Resolved: 2024-01-18

## 2024-01-18 PROBLEM — E11.621 DIABETIC FOOT ULCER (MULTI): Status: RESOLVED | Noted: 2023-04-21 | Resolved: 2024-01-18

## 2024-01-18 PROCEDURE — 1036F TOBACCO NON-USER: CPT | Performed by: INTERNAL MEDICINE

## 2024-01-18 PROCEDURE — 4010F ACE/ARB THERAPY RXD/TAKEN: CPT | Performed by: INTERNAL MEDICINE

## 2024-01-18 PROCEDURE — 1159F MED LIST DOCD IN RCRD: CPT | Performed by: INTERNAL MEDICINE

## 2024-01-18 PROCEDURE — 1160F RVW MEDS BY RX/DR IN RCRD: CPT | Performed by: INTERNAL MEDICINE

## 2024-01-18 PROCEDURE — 3066F NEPHROPATHY DOC TX: CPT | Performed by: INTERNAL MEDICINE

## 2024-01-18 PROCEDURE — 3079F DIAST BP 80-89 MM HG: CPT | Performed by: INTERNAL MEDICINE

## 2024-01-18 PROCEDURE — 1126F AMNT PAIN NOTED NONE PRSNT: CPT | Performed by: INTERNAL MEDICINE

## 2024-01-18 PROCEDURE — 3077F SYST BP >= 140 MM HG: CPT | Performed by: INTERNAL MEDICINE

## 2024-01-18 PROCEDURE — 99213 OFFICE O/P EST LOW 20 MIN: CPT | Performed by: INTERNAL MEDICINE

## 2024-01-18 RX ORDER — AMLODIPINE BESYLATE 10 MG/1
10 TABLET ORAL DAILY
Qty: 30 TABLET | Refills: 5 | Status: SHIPPED | OUTPATIENT
Start: 2024-01-18 | End: 2024-07-16

## 2024-01-18 NOTE — PROGRESS NOTES
"Subjective   Patient ID: Markos Interiano is a 68 y.o. male who presents for Follow-up.    HPI   Seemingly compliant with medications, tolerating regimens.  Blood pressure seemingly better, but patient does state that he still has some blood pressure readings in the 170s systolically from time to time.  Diastolically has been in the 70s.  Hemodynamically asymptomatic, with no landon substernal chest pain, no orthopnea, no paroxysmal nocturnal dyspnea.    Overdue to see nephrology, endocrinology, ophthalmology, and understands the importance of compliance.  Son-in-law in attendance, making sure that we understand each other, and he does point out that the patient just forgets to do things.  This includes FASTING laboratory examinations that were supposed to be done.  Otherwise, with health, compliant with medications, tolerating regimens.  Appetite preserved, no abdominal distress.  No dysuria, flank, suprapubic pain.  No skin changes, rashes, pruritus, jaundice.  Kept appointment with PODIATRY.  ENDOCRINE with no polyuria, polydipsia, polyphagia.  No blurred vision.  No skin, hair, nail changes.  No dramatic weight loss or weight gain.      Physically active.  No particular cough, no particular sputum production.  CONSTITUTIONALLY, no fever, no chills.  No night sweats.  No lingering anorexia or nausea.  No apparent lymphadenopathy.  No apparent weight loss.        Review of Systems  Review of systems as in history of present illness, and otherwise, reviewed separately as well, and was unremarkable/negative/noncontributory.        Objective   /86 (BP Location: Left arm, Patient Position: Sitting, BP Cuff Size: Adult)   Pulse 90   Resp 20   Ht 1.803 m (5' 11\")   Wt 93 kg (205 lb)   SpO2 96%   BMI 28.59 kg/m²     Physical Exam  In good spirits.  Not in distress or diaphoresis.  Alert, oriented x 3.  Amiable.  Not unkempt.  Receptive, cheerful, appropriate.  Eager to stay healthy, independent, and " productive, and promises to be more compliant.  Son-in-law in attendance, also making sure that he understands everything, and that I understand that he does plan to do a better job taking care of himself.  Promises to keep appointments.    HEAD pink palpebral conjunctivae, anicteric sclerae.  NECK supple, no apparent jugular venous distention.  CARDIOVASCULAR not in distress or diaphoresis.  No bipedal edema.  LUNGS not in distress or diaphoresis.  Not using accessory muscles.  ABDOMEN soft, nontender.  BACK no costovertebral angle tenderness.  EXTREMITIES no clubbing, no cyanosis.  NEURO no facial asymmetry.  No apparent cranial nerve deficits.  Romberg negative.  Ambulating without need of assistance.  No apparent focal weakness.  No tremors.  PSYCH receptive, appropriate, and eager to maintain and improve quality of life.      LABORATORY results reviewed, needing updating.      Assessment/Plan   Diagnoses and all orders for this visit:  Essential hypertension  -     Follow Up In Primary Care - Established  -     amLODIPine (Norvasc) 10 mg tablet; Take 1 tablet (10 mg) by mouth once daily.  -     Follow Up In Primary Care - Established; Future  Noncompliance  -     Follow Up In Primary Care - Established; Future  Stage 3a chronic kidney disease (CMS/HCC)  -     Follow Up In Primary Care - Established; Future  Type 2 diabetes mellitus with stage 3a chronic kidney disease, with long-term current use of insulin (CMS/HCC)  -     Follow Up In Primary Care - Established; Future  Mixed hyperlipidemia  -     Follow Up In Primary Care - Established; Future       Thank you very much for coming.  I am very happy to see you.    I am glad to see that your blood pressure is better, but I do understand that from time to time, you still have the first number to be as high as 170!    Please do not forget to do your FASTING laboratory examinations soon.  I will send word regarding results and possible changes.  Please make sure to  keep your appointment with NEPHROLOGY, Dr. Patel.  His specialty is blood pressure control!    Please continue to drink lots of fluids throughout the day, and please continue to avoid salt.  Please continue staying physically active.  All of these contribute to better blood pressure readings.    Likewise, please continue following with your FOOT DOCTOR.  Please see your EYE DOCTOR regularly, as well as the DIABETES SPECIALIST.  These are all very important appointments please do keep.    Please let me see you again in 3 weeks, so that we can review your laboratory results together.  Until then, please continue to take care of yourself and your family, and please continue to pray for our recovery from this pandemic.    Please go to your local pharmacy, and get yourself vaccinated for pneumonia with PREVNAR 20.  This is very important, very helpful, and only needs to be done once in your lifetime!    I hope you had a good birthday and a good [place]!  I hope you have a happy new year.            0  Return in 3 weeks.  20 minutes please.  Reassess hemodynamics, cardiovascular risk.  Coordinate with nephrology.  Reassess compliance, tolerance.  Review preventive strategies, coordinate with endocrinology, ophthalmology.  Reassess compliance, tolerance.            0  The patient has yet to do his FASTING laboratory examinations, and has yet to be seen by nephrology once more.  He also was due to see ophthalmology and endocrinology.  Reminded to please keep his appointments.  Has also been following closely with PODIATRY.  Son-in-law in attendance, making sure that we both understand each other, and that he understands the importance of compliance.            0

## 2024-01-18 NOTE — PATIENT INSTRUCTIONS
Thank you very much for coming.  I am very happy to see you.    I am glad to see that your blood pressure is better, but I do understand that from time to time, you still have the first number to be as high as 170!    Please do not forget to do your FASTING laboratory examinations soon.  I will send word regarding results and possible changes.  Please make sure to keep your appointment with NEPHROLOGY, Dr. Patel.  His specialty is blood pressure control!    Please continue to drink lots of fluids throughout the day, and please continue to avoid salt.  Please continue staying physically active.  All of these contribute to better blood pressure readings.    Likewise, please continue following with your FOOT DOCTOR.  Please see your EYE DOCTOR regularly, as well as the DIABETES SPECIALIST.  These are all very important appointments please do keep.    Please let me see you again in 3 weeks, so that we can review your laboratory results together.  Until then, please continue to take care of yourself and your family, and please continue to pray for our recovery from this pandemic.    Please go to your local pharmacy, and get yourself vaccinated for pneumonia with PREVNAR 20.  This is very important, very helpful, and only needs to be done once in your lifetime!    I hope you had a good birthday and a good [place]!  I hope you have a happy new year.            0  Return in 3 weeks.  20 minutes please.  Reassess hemodynamics, cardiovascular risk.  Coordinate with nephrology.  Reassess compliance, tolerance.  Review preventive strategies, coordinate with endocrinology, ophthalmology.  Reassess compliance, tolerance.            0

## 2024-02-08 ENCOUNTER — OFFICE VISIT (OUTPATIENT)
Dept: PRIMARY CARE | Facility: CLINIC | Age: 69
End: 2024-02-08
Payer: COMMERCIAL

## 2024-02-08 VITALS
OXYGEN SATURATION: 95 % | HEIGHT: 71 IN | HEART RATE: 88 BPM | WEIGHT: 223.6 LBS | BODY MASS INDEX: 31.3 KG/M2 | SYSTOLIC BLOOD PRESSURE: 133 MMHG | RESPIRATION RATE: 22 BRPM | DIASTOLIC BLOOD PRESSURE: 78 MMHG

## 2024-02-08 DIAGNOSIS — E78.2 MIXED HYPERLIPIDEMIA: ICD-10-CM

## 2024-02-08 DIAGNOSIS — I10 ESSENTIAL HYPERTENSION: Primary | ICD-10-CM

## 2024-02-08 DIAGNOSIS — N18.31 TYPE 2 DIABETES MELLITUS WITH STAGE 3A CHRONIC KIDNEY DISEASE, WITH LONG-TERM CURRENT USE OF INSULIN (MULTI): ICD-10-CM

## 2024-02-08 DIAGNOSIS — E11.22 TYPE 2 DIABETES MELLITUS WITH STAGE 3A CHRONIC KIDNEY DISEASE, WITH LONG-TERM CURRENT USE OF INSULIN (MULTI): ICD-10-CM

## 2024-02-08 DIAGNOSIS — Z91.199 NONCOMPLIANCE: ICD-10-CM

## 2024-02-08 DIAGNOSIS — N18.31 STAGE 3A CHRONIC KIDNEY DISEASE (MULTI): ICD-10-CM

## 2024-02-08 DIAGNOSIS — E55.9 VITAMIN D DEFICIENCY: ICD-10-CM

## 2024-02-08 DIAGNOSIS — Z79.4 TYPE 2 DIABETES MELLITUS WITH STAGE 3A CHRONIC KIDNEY DISEASE, WITH LONG-TERM CURRENT USE OF INSULIN (MULTI): ICD-10-CM

## 2024-02-08 DIAGNOSIS — D63.8 ANEMIA, CHRONIC DISEASE: ICD-10-CM

## 2024-02-08 PROCEDURE — 1036F TOBACCO NON-USER: CPT | Performed by: INTERNAL MEDICINE

## 2024-02-08 PROCEDURE — 1159F MED LIST DOCD IN RCRD: CPT | Performed by: INTERNAL MEDICINE

## 2024-02-08 PROCEDURE — 4010F ACE/ARB THERAPY RXD/TAKEN: CPT | Performed by: INTERNAL MEDICINE

## 2024-02-08 PROCEDURE — 90677 PCV20 VACCINE IM: CPT | Performed by: INTERNAL MEDICINE

## 2024-02-08 PROCEDURE — G0009 ADMIN PNEUMOCOCCAL VACCINE: HCPCS | Performed by: INTERNAL MEDICINE

## 2024-02-08 PROCEDURE — 1160F RVW MEDS BY RX/DR IN RCRD: CPT | Performed by: INTERNAL MEDICINE

## 2024-02-08 PROCEDURE — 3075F SYST BP GE 130 - 139MM HG: CPT | Performed by: INTERNAL MEDICINE

## 2024-02-08 PROCEDURE — 3078F DIAST BP <80 MM HG: CPT | Performed by: INTERNAL MEDICINE

## 2024-02-08 PROCEDURE — 1126F AMNT PAIN NOTED NONE PRSNT: CPT | Performed by: INTERNAL MEDICINE

## 2024-02-08 PROCEDURE — 99213 OFFICE O/P EST LOW 20 MIN: CPT | Performed by: INTERNAL MEDICINE

## 2024-02-08 NOTE — PROGRESS NOTES
"Subjective   Patient ID: Markos Interiano is a 68 y.o. male who presents for Follow-up.    HPI   Seemingly more successful and more compliant with medications, tolerating regimens, blood pressure readings ranging from 130s to 150s systolically.  No landon substernal chest pain, no orthopnea, no paroxysmal nocturnal dyspnea.    Has yet to see NEPHROLOGY, and has yet to do FASTING blood examinations for me.  In the meantime, no dysuria, flank, suprapubic pain, or any urine changes.  No lingering anorexia or nausea.  No lethargy, no irritability, no confusion, no jaundice, or any other symptoms that might be referable to uremia.    No complaints, although son-in-law is frustrated that the patient does not seem to listen to him regarding getting his lab work done.  Still, the patient does want to get better.  No headache, blurred vision, diplopia.  No dysphagia.  Not wishing harm to self or others.  No particular cough, no particular sputum production.  CONSTITUTIONALLY, no fever, no chills.  No night sweats.  No lingering anorexia or nausea.  No apparent lymphadenopathy.  No apparent weight loss.        Review of Systems  Review of systems as in history of present illness, and otherwise, reviewed separately as well, and was unremarkable/negative/noncontributory.        Objective   /78 (BP Location: Right arm, Patient Position: Sitting, BP Cuff Size: Adult)   Pulse 88   Resp 22   Ht 1.803 m (5' 11\")   Wt 101 kg (223 lb 9.6 oz)   SpO2 95%   BMI 31.19 kg/m²     Physical Exam  In good spirits.  Not in distress or diaphoresis.  Alert, oriented x 3.  Amiable.  Not unkempt.  Continues to want to get better, and does promise to improve compliance.  Not wishing harm to self or others.  Patient seemingly a little contrite perhaps, but son-in-law in attendance, little frustrated.  He makes sure that we understand each other by translating.    HEAD pink palpebral conjunctivae, anicteric sclerae.  NECK supple, no " apparent jugular venous distention.  CARDIOVASCULAR not in distress or diaphoresis.  No bipedal edema.  LUNGS not in distress or diaphoresis.  Not using accessory muscles.  ABDOMEN soft, nontender.  BACK no costovertebral angle tenderness.  EXTREMITIES no clubbing, no cyanosis.  NEURO no facial asymmetry.  No apparent cranial nerve deficits.  Ambulating without need of assistance.  No apparent focal weakness.  No tremors.  PSYCH receptive, appropriate, and eager to maintain and improve quality of life.        Assessment/Plan   Diagnoses and all orders for this visit:  Essential hypertension  -     Pneumococcal conjugate vaccine, 20-valent (PREVNAR 20)  -     Follow Up In Primary Care - Established; Future  Mixed hyperlipidemia  -     Pneumococcal conjugate vaccine, 20-valent (PREVNAR 20)  -     Follow Up In Primary Care - Established; Future  Type 2 diabetes mellitus with stage 3a chronic kidney disease, with long-term current use of insulin (CMS/HCC)  -     Pneumococcal conjugate vaccine, 20-valent (PREVNAR 20)  -     Follow Up In Primary Care - Established; Future  Noncompliance  -     Pneumococcal conjugate vaccine, 20-valent (PREVNAR 20)  -     Follow Up In Primary Care - Established; Future  Stage 3a chronic kidney disease (CMS/HCC)  -     Pneumococcal conjugate vaccine, 20-valent (PREVNAR 20)  -     Follow Up In Primary Care - Established; Future  Vitamin D deficiency  -     Pneumococcal conjugate vaccine, 20-valent (PREVNAR 20)  -     Follow Up In Primary Care - Established; Future  Anemia, chronic disease  -     Pneumococcal conjugate vaccine, 20-valent (PREVNAR 20)  -     Follow Up In Primary Care - Established; Future       Thank you very much for coming.  I am very happy to see you.    You will benefit from a PNEUMONIA vaccination today, PREVNAR 20.  This may make you a little tired and achy.  Drink lots of fluids throughout the day.  Get lots of rest and sleep.  You should be okay after a day or 2.    In  the meantime, you have yet to do your FASTING laboratory examinations.  Please have these done at any  facility, [facility] in [place], [facility], or even [facility] or [facility], wherever you can get it done.  I will send word regarding results and possible changes.    Please come back in 2 months with the benefit of fasting lab work, and seeing your specialists.  I will coordinate with their recommendations and plans.    Until then, please continue to take care of yourself and each other, and please continue to pray for our recovery from this pandemic.  See you in 2 months.            0  Return in 2 months.  20 minutes please.  Reassess compliance, tolerance, hemodynamics, cardiovascular risk.  Review preventive strategies.  Coordinate with nephrology, endocrinology, other specialists as the patient is seen.            0

## 2024-04-19 ENCOUNTER — APPOINTMENT (OUTPATIENT)
Dept: RADIOLOGY | Facility: HOSPITAL | Age: 69
End: 2024-04-19
Payer: COMMERCIAL

## 2024-04-19 ENCOUNTER — HOSPITAL ENCOUNTER (EMERGENCY)
Facility: HOSPITAL | Age: 69
Discharge: HOME | End: 2024-04-20
Payer: COMMERCIAL

## 2024-04-19 DIAGNOSIS — M54.50 ACUTE RIGHT-SIDED LOW BACK PAIN WITHOUT SCIATICA: Primary | ICD-10-CM

## 2024-04-19 DIAGNOSIS — N28.9 RENAL INSUFFICIENCY: ICD-10-CM

## 2024-04-19 DIAGNOSIS — R10.9 ACUTE RIGHT FLANK PAIN: ICD-10-CM

## 2024-04-19 LAB
APPEARANCE UR: ABNORMAL
BASOPHILS # BLD AUTO: 0.08 X10*3/UL (ref 0–0.1)
BASOPHILS NFR BLD AUTO: 0.8 %
BILIRUB UR STRIP.AUTO-MCNC: NEGATIVE MG/DL
COLOR UR: YELLOW
EOSINOPHIL # BLD AUTO: 0.12 X10*3/UL (ref 0–0.7)
EOSINOPHIL NFR BLD AUTO: 1.3 %
ERYTHROCYTE [DISTWIDTH] IN BLOOD BY AUTOMATED COUNT: 13.8 % (ref 11.5–14.5)
GLUCOSE UR STRIP.AUTO-MCNC: ABNORMAL MG/DL
HCT VFR BLD AUTO: 42.2 % (ref 41–52)
HGB BLD-MCNC: 14.7 G/DL (ref 13.5–17.5)
HYALINE CASTS #/AREA URNS AUTO: ABNORMAL /LPF
IMM GRANULOCYTES # BLD AUTO: 0.02 X10*3/UL (ref 0–0.7)
IMM GRANULOCYTES NFR BLD AUTO: 0.2 % (ref 0–0.9)
KETONES UR STRIP.AUTO-MCNC: NEGATIVE MG/DL
LEUKOCYTE ESTERASE UR QL STRIP.AUTO: NEGATIVE
LYMPHOCYTES # BLD AUTO: 1.99 X10*3/UL (ref 1.2–4.8)
LYMPHOCYTES NFR BLD AUTO: 21.1 %
MCH RBC QN AUTO: 30.4 PG (ref 26–34)
MCHC RBC AUTO-ENTMCNC: 34.8 G/DL (ref 32–36)
MCV RBC AUTO: 87 FL (ref 80–100)
MONOCYTES # BLD AUTO: 0.68 X10*3/UL (ref 0.1–1)
MONOCYTES NFR BLD AUTO: 7.2 %
MUCOUS THREADS #/AREA URNS AUTO: ABNORMAL /LPF
NEUTROPHILS # BLD AUTO: 6.56 X10*3/UL (ref 1.2–7.7)
NEUTROPHILS NFR BLD AUTO: 69.4 %
NITRITE UR QL STRIP.AUTO: NEGATIVE
NRBC BLD-RTO: 0 /100 WBCS (ref 0–0)
PH UR STRIP.AUTO: 5 [PH]
PLATELET # BLD AUTO: 232 X10*3/UL (ref 150–450)
PROT UR STRIP.AUTO-MCNC: ABNORMAL MG/DL
RBC # BLD AUTO: 4.84 X10*6/UL (ref 4.5–5.9)
RBC # UR STRIP.AUTO: NEGATIVE /UL
RBC #/AREA URNS AUTO: ABNORMAL /HPF
SP GR UR STRIP.AUTO: 1.02
UROBILINOGEN UR STRIP.AUTO-MCNC: <2 MG/DL
WBC # BLD AUTO: 9.5 X10*3/UL (ref 4.4–11.3)
WBC #/AREA URNS AUTO: ABNORMAL /HPF

## 2024-04-19 PROCEDURE — 72131 CT LUMBAR SPINE W/O DYE: CPT

## 2024-04-19 PROCEDURE — 85025 COMPLETE CBC W/AUTO DIFF WBC: CPT | Performed by: PHYSICIAN ASSISTANT

## 2024-04-19 PROCEDURE — 80053 COMPREHEN METABOLIC PANEL: CPT | Performed by: PHYSICIAN ASSISTANT

## 2024-04-19 PROCEDURE — 81001 URINALYSIS AUTO W/SCOPE: CPT | Performed by: PHYSICIAN ASSISTANT

## 2024-04-19 PROCEDURE — 96361 HYDRATE IV INFUSION ADD-ON: CPT

## 2024-04-19 PROCEDURE — 36415 COLL VENOUS BLD VENIPUNCTURE: CPT | Performed by: PHYSICIAN ASSISTANT

## 2024-04-19 PROCEDURE — 2500000004 HC RX 250 GENERAL PHARMACY W/ HCPCS (ALT 636 FOR OP/ED): Performed by: PHYSICIAN ASSISTANT

## 2024-04-19 PROCEDURE — 72131 CT LUMBAR SPINE W/O DYE: CPT | Performed by: RADIOLOGY

## 2024-04-19 PROCEDURE — 96375 TX/PRO/DX INJ NEW DRUG ADDON: CPT

## 2024-04-19 PROCEDURE — 99285 EMERGENCY DEPT VISIT HI MDM: CPT | Mod: 25

## 2024-04-19 PROCEDURE — 74176 CT ABD & PELVIS W/O CONTRAST: CPT

## 2024-04-19 PROCEDURE — 74176 CT ABD & PELVIS W/O CONTRAST: CPT | Performed by: RADIOLOGY

## 2024-04-19 PROCEDURE — 96374 THER/PROPH/DIAG INJ IV PUSH: CPT

## 2024-04-19 RX ORDER — MORPHINE SULFATE 4 MG/ML
4 INJECTION, SOLUTION INTRAMUSCULAR; INTRAVENOUS ONCE
Status: COMPLETED | OUTPATIENT
Start: 2024-04-19 | End: 2024-04-19

## 2024-04-19 RX ORDER — ONDANSETRON HYDROCHLORIDE 2 MG/ML
4 INJECTION, SOLUTION INTRAVENOUS ONCE
Status: COMPLETED | OUTPATIENT
Start: 2024-04-19 | End: 2024-04-19

## 2024-04-19 RX ADMIN — SODIUM CHLORIDE 500 ML: 9 INJECTION, SOLUTION INTRAVENOUS at 23:17

## 2024-04-19 RX ADMIN — MORPHINE SULFATE 4 MG: 4 INJECTION, SOLUTION INTRAMUSCULAR; INTRAVENOUS at 23:18

## 2024-04-19 RX ADMIN — ONDANSETRON 4 MG: 2 INJECTION INTRAMUSCULAR; INTRAVENOUS at 23:17

## 2024-04-19 ASSESSMENT — PAIN DESCRIPTION - DESCRIPTORS: DESCRIPTORS: SHARP;RADIATING

## 2024-04-19 ASSESSMENT — COLUMBIA-SUICIDE SEVERITY RATING SCALE - C-SSRS
1. IN THE PAST MONTH, HAVE YOU WISHED YOU WERE DEAD OR WISHED YOU COULD GO TO SLEEP AND NOT WAKE UP?: NO
2. HAVE YOU ACTUALLY HAD ANY THOUGHTS OF KILLING YOURSELF?: NO
6. HAVE YOU EVER DONE ANYTHING, STARTED TO DO ANYTHING, OR PREPARED TO DO ANYTHING TO END YOUR LIFE?: NO

## 2024-04-19 ASSESSMENT — PAIN DESCRIPTION - LOCATION: LOCATION: BACK

## 2024-04-19 ASSESSMENT — PAIN DESCRIPTION - PAIN TYPE: TYPE: ACUTE PAIN

## 2024-04-19 ASSESSMENT — PAIN SCALES - GENERAL
PAINLEVEL_OUTOF10: 10 - WORST POSSIBLE PAIN
PAINLEVEL_OUTOF10: 4

## 2024-04-19 ASSESSMENT — PAIN - FUNCTIONAL ASSESSMENT: PAIN_FUNCTIONAL_ASSESSMENT: 0-10

## 2024-04-20 VITALS
BODY MASS INDEX: 36.4 KG/M2 | WEIGHT: 260 LBS | SYSTOLIC BLOOD PRESSURE: 181 MMHG | OXYGEN SATURATION: 97 % | HEIGHT: 71 IN | TEMPERATURE: 98.2 F | HEART RATE: 75 BPM | RESPIRATION RATE: 16 BRPM | DIASTOLIC BLOOD PRESSURE: 111 MMHG

## 2024-04-20 LAB
ALBUMIN SERPL BCP-MCNC: 4.5 G/DL (ref 3.4–5)
ALP SERPL-CCNC: 72 U/L (ref 33–136)
ALT SERPL W P-5'-P-CCNC: 30 U/L (ref 10–52)
ANION GAP SERPL CALC-SCNC: 12 MMOL/L (ref 10–20)
AST SERPL W P-5'-P-CCNC: 21 U/L (ref 9–39)
BILIRUB SERPL-MCNC: 0.9 MG/DL (ref 0–1.2)
BUN SERPL-MCNC: 22 MG/DL (ref 6–23)
CALCIUM SERPL-MCNC: 9.9 MG/DL (ref 8.6–10.3)
CHLORIDE SERPL-SCNC: 99 MMOL/L (ref 98–107)
CO2 SERPL-SCNC: 30 MMOL/L (ref 21–32)
CREAT SERPL-MCNC: 1.41 MG/DL (ref 0.5–1.3)
EGFRCR SERPLBLD CKD-EPI 2021: 54 ML/MIN/1.73M*2
GLUCOSE SERPL-MCNC: 230 MG/DL (ref 74–99)
HOLD SPECIMEN: NORMAL
HOLD SPECIMEN: NORMAL
POTASSIUM SERPL-SCNC: 3.9 MMOL/L (ref 3.5–5.3)
PROT SERPL-MCNC: 8.2 G/DL (ref 6.4–8.2)
SODIUM SERPL-SCNC: 137 MMOL/L (ref 136–145)

## 2024-04-20 PROCEDURE — 2500000001 HC RX 250 WO HCPCS SELF ADMINISTERED DRUGS (ALT 637 FOR MEDICARE OP): Performed by: PHYSICIAN ASSISTANT

## 2024-04-20 PROCEDURE — 2500000005 HC RX 250 GENERAL PHARMACY W/O HCPCS: Performed by: PHYSICIAN ASSISTANT

## 2024-04-20 RX ORDER — LIDOCAINE 560 MG/1
2 PATCH PERCUTANEOUS; TOPICAL; TRANSDERMAL ONCE
Status: DISCONTINUED | OUTPATIENT
Start: 2024-04-20 | End: 2024-04-20 | Stop reason: HOSPADM

## 2024-04-20 RX ORDER — NAPROXEN 500 MG/1
500 TABLET ORAL
Qty: 30 TABLET | Refills: 0 | Status: SHIPPED | OUTPATIENT
Start: 2024-04-20 | End: 2024-05-05

## 2024-04-20 RX ORDER — AMLODIPINE BESYLATE 5 MG/1
10 TABLET ORAL ONCE
Status: COMPLETED | OUTPATIENT
Start: 2024-04-20 | End: 2024-04-20

## 2024-04-20 RX ORDER — LIDOCAINE 50 MG/G
1 PATCH TOPICAL DAILY
Qty: 14 PATCH | Refills: 0 | Status: SHIPPED | OUTPATIENT
Start: 2024-04-20 | End: 2024-04-25

## 2024-04-20 RX ORDER — METHOCARBAMOL 500 MG/1
500 TABLET, FILM COATED ORAL 3 TIMES DAILY
Qty: 21 TABLET | Refills: 0 | Status: SHIPPED | OUTPATIENT
Start: 2024-04-20 | End: 2024-04-27

## 2024-04-20 RX ORDER — ACETAMINOPHEN 325 MG/1
650 TABLET ORAL EVERY 6 HOURS PRN
Qty: 30 TABLET | Refills: 0 | Status: SHIPPED | OUTPATIENT
Start: 2024-04-20 | End: 2024-04-30

## 2024-04-20 RX ADMIN — AMLODIPINE BESYLATE 10 MG: 5 TABLET ORAL at 01:16

## 2024-04-20 RX ADMIN — LIDOCAINE 2 PATCH: 4 PATCH TOPICAL at 01:52

## 2024-04-20 NOTE — ED PROVIDER NOTES
HPI   Chief Complaint   Patient presents with    Back Pain     Pain in lower right back going down his butt and leg per daughter who is translating in triage       68-year-old male presents emergency room with chief complaint of right flank pain going into his lower back.  Patient reports his symptoms began a couple of days ago.  He denies any falls or injuries, denies any saddle paresthesias or any bladder or bowel dysfunction.  The pain is worse with movement and there is no alleviating factors.  No history of IV drug use, alcohol abuse or recent epidural injections, he denies any abdominal pain, chest pain or upper back pain.  He denies any numbness or tingling to his extremities.  Patient denies any history of any back injuries.  No recent falls or heavy lifting.  He does have a past medical history of chronic anemia, elevated CPK hypertension GERD, hyperlipidemia daytime sleepiness, arthritis of the left knee, stage III chronic kidney disease, type 2 diabetes,      History provided by:  Patient, medical records and relative                      Travis Coma Scale Score: 15                     Patient History   Past Medical History:   Diagnosis Date    COVID-19 12/27/2020    COVID-19 virus detected    Other conditions influencing health status 05/10/2021    History of non-healing wound    Personal history of other endocrine, nutritional and metabolic disease     History of diabetes mellitus     Past Surgical History:   Procedure Laterality Date    COLONOSCOPY  12/08/2020    Complete Colonoscopy     Family History   Problem Relation Name Age of Onset    Diabetes Mother      Hypertension Father      Heart attack Father       Social History     Tobacco Use    Smoking status: Never    Smokeless tobacco: Never   Substance Use Topics    Alcohol use: Not on file    Drug use: Not on file       Physical Exam   ED Triage Vitals [04/19/24 2210]   Temperature Heart Rate Respirations BP   36.8 °C (98.2 °F) 90 18 (!) 187/104       Pulse Ox Temp Source Heart Rate Source Patient Position   96 % Temporal Monitor Sitting      BP Location FiO2 (%)     Right arm --       Physical Exam  Vitals and nursing note reviewed. Exam conducted with a chaperone present.   Constitutional:       Appearance: Normal appearance. He is normal weight.   HENT:      Head: Normocephalic and atraumatic.      Right Ear: Tympanic membrane, ear canal and external ear normal.      Left Ear: Tympanic membrane, ear canal and external ear normal.      Nose: Nose normal.      Mouth/Throat:      Mouth: Mucous membranes are moist.      Pharynx: Oropharynx is clear.   Eyes:      Extraocular Movements: Extraocular movements intact.      Conjunctiva/sclera: Conjunctivae normal.      Pupils: Pupils are equal, round, and reactive to light.   Cardiovascular:      Rate and Rhythm: Normal rate and regular rhythm.      Pulses: Normal pulses.      Heart sounds: Normal heart sounds.   Pulmonary:      Effort: Pulmonary effort is normal.      Breath sounds: Normal breath sounds. No wheezing, rhonchi or rales.   Abdominal:      General: Abdomen is flat. Bowel sounds are normal.      Palpations: Abdomen is soft. There is no mass.      Tenderness: There is no abdominal tenderness. There is right CVA tenderness. There is no left CVA tenderness, guarding or rebound. Negative signs include Hopper's sign, Rovsing's sign, McBurney's sign, psoas sign and obturator sign.      Hernia: No hernia is present.       Musculoskeletal:         General: No swelling.      Cervical back: Normal, normal range of motion and neck supple.      Thoracic back: Normal.      Lumbar back: Spasms, tenderness and bony tenderness present. Decreased range of motion.        Back:       Comments: Right-sided lower lumbar paraspinal tenderness.   Skin:     General: Skin is warm and dry.      Capillary Refill: Capillary refill takes less than 2 seconds.      Findings: No rash.   Neurological:      General: No focal deficit  present.      Mental Status: He is alert and oriented to person, place, and time. Mental status is at baseline.   Psychiatric:         Mood and Affect: Mood normal.         Behavior: Behavior normal.         Thought Content: Thought content normal.         Judgment: Judgment normal.         ED Course & MDM   Diagnoses as of 04/20/24 0126   Acute right-sided low back pain without sciatica   Acute right flank pain   Renal insufficiency       Medical Decision Making  Temperature is 36 8 heart rate 90 respirations 18 /104, pulse ox is 96% on room air  I have ordered lab work urinalysis, he was given a 500 cc bolus normal saline, he was given 4 mg of morphine IV push, 4 mg of Zofran IV push and kept NPO.    CBC white count 9.5, hemoglobin 14.7 Smith crit 43.2 platelet count was 232, metabolic glucose 230 creatinine 1.41 with a GFR 54, urine hazy appearance 500 protein, glucose no evidence of UTI negative nitrites, negative leukocyte esterase, no sign of UTI.  CT scan of the abdomen pelvis without contrast shows no acute evidence of a renal calculus or hydronephrosis, urinary bladder wall thickening no evidence of bowel obstruction acute appendicitis, noncontrast CT scan lumbar spine shows multilevel degenerative changes without evidence of acute fracture or subluxation.  On repeat examination the patient is still hypertensive and states he only took one of his 2 doses of his blood pressure medication he was given his evening dose in the ED.  I discussed results of workup with the patient's point, I do not suspect cauda equina syndrome, AAA, kidney stone, pyelonephritis, diverticulitis, appendicitis, bowel obstruction there is no signs of an ileus, the patient's back pain is worse with movement.  There is no rashes to suggest shingles.  The patient was discharged home with prescription for Tylenol, naproxen, Robaxin and topical lidocaine patches.  He was advised to continue with his home medications as prescribed  and [facility].  Return precautions were discussed all questions answered prior to discharge        Procedure  Procedures     Leandro Nogueira PA-C  04/20/24 0129       Leandro Nogueira PA-C  04/20/24 0131

## 2024-04-25 ENCOUNTER — OFFICE VISIT (OUTPATIENT)
Dept: PRIMARY CARE | Facility: CLINIC | Age: 69
End: 2024-04-25
Payer: COMMERCIAL

## 2024-04-25 VITALS
HEIGHT: 71 IN | WEIGHT: 221 LBS | SYSTOLIC BLOOD PRESSURE: 141 MMHG | DIASTOLIC BLOOD PRESSURE: 99 MMHG | BODY MASS INDEX: 30.94 KG/M2

## 2024-04-25 DIAGNOSIS — E11.22 TYPE 2 DIABETES MELLITUS WITH STAGE 3A CHRONIC KIDNEY DISEASE, WITH LONG-TERM CURRENT USE OF INSULIN (MULTI): ICD-10-CM

## 2024-04-25 DIAGNOSIS — E55.9 VITAMIN D DEFICIENCY: ICD-10-CM

## 2024-04-25 DIAGNOSIS — D63.8 ANEMIA, CHRONIC DISEASE: ICD-10-CM

## 2024-04-25 DIAGNOSIS — E78.2 MIXED HYPERLIPIDEMIA: ICD-10-CM

## 2024-04-25 DIAGNOSIS — Z79.4 TYPE 2 DIABETES MELLITUS WITH STAGE 3A CHRONIC KIDNEY DISEASE, WITH LONG-TERM CURRENT USE OF INSULIN (MULTI): ICD-10-CM

## 2024-04-25 DIAGNOSIS — I10 ESSENTIAL HYPERTENSION: ICD-10-CM

## 2024-04-25 DIAGNOSIS — N18.31 TYPE 2 DIABETES MELLITUS WITH STAGE 3A CHRONIC KIDNEY DISEASE, WITH LONG-TERM CURRENT USE OF INSULIN (MULTI): ICD-10-CM

## 2024-04-25 DIAGNOSIS — E66.09 CLASS 1 OBESITY DUE TO EXCESS CALORIES WITH SERIOUS COMORBIDITY AND BODY MASS INDEX (BMI) OF 30.0 TO 30.9 IN ADULT: ICD-10-CM

## 2024-04-25 DIAGNOSIS — M54.41 ACUTE RIGHT-SIDED LOW BACK PAIN WITH RIGHT-SIDED SCIATICA: ICD-10-CM

## 2024-04-25 DIAGNOSIS — Z91.199 NONCOMPLIANCE: Primary | ICD-10-CM

## 2024-04-25 PROCEDURE — 1036F TOBACCO NON-USER: CPT | Performed by: INTERNAL MEDICINE

## 2024-04-25 PROCEDURE — 3008F BODY MASS INDEX DOCD: CPT | Performed by: INTERNAL MEDICINE

## 2024-04-25 PROCEDURE — 1160F RVW MEDS BY RX/DR IN RCRD: CPT | Performed by: INTERNAL MEDICINE

## 2024-04-25 PROCEDURE — 4010F ACE/ARB THERAPY RXD/TAKEN: CPT | Performed by: INTERNAL MEDICINE

## 2024-04-25 PROCEDURE — 3080F DIAST BP >= 90 MM HG: CPT | Performed by: INTERNAL MEDICINE

## 2024-04-25 PROCEDURE — 99213 OFFICE O/P EST LOW 20 MIN: CPT | Performed by: INTERNAL MEDICINE

## 2024-04-25 PROCEDURE — 1159F MED LIST DOCD IN RCRD: CPT | Performed by: INTERNAL MEDICINE

## 2024-04-25 PROCEDURE — 3077F SYST BP >= 140 MM HG: CPT | Performed by: INTERNAL MEDICINE

## 2024-04-25 NOTE — PROGRESS NOTES
"Subjective   Patient ID: Markos Interiano is a 68 y.o. male who presents for Follow-up (Patient presented today for a 2 month follow up./).    HPI   The patient has yet to do his FASTING laboratory examinations.  In the meantime, not sure if he is compliant with his medications, which we will list for him to review at home.  He also has to reestablish with NEPHROLOGY, Dr. Patel, as well as ENDOCRINOLOGY, Dr. Jameson.    Recent visit to the ER for right-sided low back pain, right-sided sciatica, with no focal weakness, no falls.  Symptomatic care given, good response.  Currently, no headache, blurred vision, diplopia.  No dysphagia.    Again, understands that he needs to be more compliant with not only diet and exercise, but also medications.  Remains physically active.  Appetite preserved, no abdominal distress.  No dysuria, flank, suprapubic pain mentioned.  No skin changes mentioned.  ENDOCRINE with no polyuria, polydipsia, polyphagia.  No blurred vision.  No skin, hair, nail changes.  No dramatic weight loss or weight gain.  CONSTITUTIONALLY, no fever, no chills.  No night sweats.  No lingering anorexia or nausea.  No apparent lymphadenopathy.  No apparent weight loss.        Review of Systems  Review of systems as in history of present illness, and otherwise, reviewed separately as well, and was unremarkable/negative/noncontributory.        Objective   BP (!) 141/99 (BP Location: Left arm, Patient Position: Sitting, BP Cuff Size: Adult)   Ht 1.803 m (5' 11\")   Wt 100 kg (221 lb)   BMI 30.82 kg/m²     Physical Exam  In otherwise good spirits.  Not in distress or diaphoresis.  Alert, oriented x 3.  Amiable.  Not unkempt.  Receptive, cheerful, appropriate, and does want to get better.  Then does understand that he needs to do better taking care of himself.  Daughter, Eli, in attendance, making sure that we understand each other.    HEAD pink palpebral conjunctivae, anicteric sclerae.  NECK supple, no " apparent jugular venous distention.  CARDIOVASCULAR not in distress or diaphoresis.  No bipedal edema.  LUNGS not in distress or diaphoresis.  Not using accessory muscles.  ABDOMEN soft, nontender.  BACK no costovertebral angle tenderness.  EXTREMITIES no clubbing, no cyanosis.  NEURO no facial asymmetry.  No apparent cranial nerve deficits.  Romberg negative.  Ambulating without need of assistance.  No apparent focal weakness.  No tremors.  PSYCH receptive, appropriate, and eager to maintain and improve quality of life.        LABORATORY results reviewed, needing updating please.  Patient understands that he has to do his fasting laboratory examinations as we have discussed.        Assessment/Plan   Diagnoses and all orders for this visit:  Noncompliance  -     Follow Up In Primary Care - Established  -     Follow Up In Primary Care - Established  -     Follow Up In Primary Care - [facility]; Future  Essential hypertension  -     Follow Up In Primary Care - [facility]; Future  Type 2 diabetes mellitus with stage 3a chronic kidney disease, with long-term current use of insulin (Multi)  -     Follow Up In Primary Care - [facility]; Future  Mixed hyperlipidemia  -     Follow Up In Primary Care - [facility]; Future  Vitamin D deficiency  -     Follow Up In Primary Care - [facility]; Future  Anemia, chronic disease  -     Follow Up In Primary Care - [facility]; Future  Class 1 obesity due to excess calories with serious comorbidity and body mass index (BMI) of 30.0 to 30.9 in adult  -     Follow Up In Primary Care - [facility]; Future  Acute right-sided low back pain with right-sided sciatica  -     Follow Up In Primary Care - Established; Future       Thank you very much for coming.  I am glad that you are here.    Your blood pressure is elevated.  Please make sure that you are taking your medications:  AMLODIPINE 10 mg by mouth every day.  LISINOPRIL 20 mg, 2 tablets by mouth every day.  METOPROLOL 100 mg by  mouth every day.  ISOSORBIDE mononitrate 60 mg by mouth every day.  HYDRALAZINE 50 mg by mouth 3 times a day.  CHLORTHALIDONE 50 mg by mouth with breakfast every day.  You are also taking potassium with your chlorthalidone.    In the ER, they checked your kidney function and your electrolytes, all coming back stable.    You have to make sure to keep your appointments with Dr. Patel, NEPHROLOGY.  He is the person who is keeping your kidneys healthy and your blood pressure controlled.    Please check your blood pressure in the evening in a calm and resting state, seated position, feet flat on the floor, use the backrest of the chair.  Write the numbers down, so that we can review them together.    Please come back in 2 weeks, so that we can recheck your blood pressure.  Until then, please make sure to drink lots of fluids throughout the day, and avoid salt.  Good for blood pressure control, and good for keeping your kidneys healthy.    You are also overdue for your FASTING blood examinations.  The orders are in the system.  You can have this done in [place] at the [facility].    Also, please make sure to get an appointment with Dr. Jameson, ENDOCRINOLOGY.  He is the person was making sure that you have good control of your blood sugar.    Again, thank you very much for coming.  See you in 2 weeks.  Call sooner as needed.  Please take better care of yourself, and please continue to pray for our recovery from this pandemic.  I hope you had a good Easter Sunday, and I do wish you a good spring time.  See you in 2 weeks.            0  Return in 2 weeks.  20 minutes please.  Reassess compliance, tolerance to medications.  Coordinate with endocrinology, nephrology, other specialists as he is seen.  Reassess hemodynamics, blood pressure control.  Review preventive strategies, including vaccination profile.            0

## 2024-04-25 NOTE — LETTER
April 25, 2024          To Whom It May Concern:          This letter is to inform you that Eli Tilley, patients daughter, will be the caretaker. She will be assisting in all aspects of daily living tasks along with cleaning, cooking, shopping and assisting with patients medications.          Any questions please contact our office at [phone]          Thank you,          Bill Mendieta MD

## 2024-04-25 NOTE — PATIENT INSTRUCTIONS
Thank you very much for coming.  I am glad that you are here.    Your blood pressure is elevated.  Please make sure that you are taking your medications:  AMLODIPINE 10 mg by mouth every day.  LISINOPRIL 20 mg, 2 tablets by mouth every day.  METOPROLOL 100 mg by mouth every day.  ISOSORBIDE mononitrate 60 mg by mouth every day.  HYDRALAZINE 50 mg by mouth 3 times a day.  CHLORTHALIDONE 50 mg by mouth with breakfast every day.  You are also taking potassium with your chlorthalidone.    In the ER, they checked your kidney function and your electrolytes, all coming back stable.    You have to make sure to keep your appointments with Dr. Patel, NEPHROLOGY.  He is the person who is keeping your kidneys healthy and your blood pressure controlled.    Please check your blood pressure in the evening in a calm and resting state, seated position, feet flat on the floor, use the backrest of the chair.  Write the numbers down, so that we can review them together.    Please come back in 2 weeks, so that we can recheck your blood pressure.  Until then, please make sure to drink lots of fluids throughout the day, and avoid salt.  Good for blood pressure control, and good for keeping your kidneys healthy.    You are also overdue for your FASTING blood examinations.  The orders are in the system.  You can have this done in [place] at the [facility].    Also, please make sure to get an appointment with Dr. Jameson, ENDOCRINOLOGY.  He is the person was making sure that you have good control of your blood sugar.    Again, thank you very much for coming.  See you in 2 weeks.  Call sooner as needed.  Please take better care of yourself, and please continue to pray for our recovery from this pandemic.  I hope you had a good Easter Sunday, and I do wish you a good spring time.  See you in 2 weeks.            0  Return in 2 weeks.  20 minutes please.  Reassess compliance, tolerance to medications.  Coordinate with endocrinology,  nephrology, other specialists as he is seen.  Reassess hemodynamics, blood pressure control.  Review preventive strategies, including vaccination profile.            0

## 2024-04-26 ENCOUNTER — LAB (OUTPATIENT)
Dept: LAB | Facility: LAB | Age: 69
End: 2024-04-26
Payer: COMMERCIAL

## 2024-04-26 DIAGNOSIS — N18.31 TYPE 2 DIABETES MELLITUS WITH STAGE 3A CHRONIC KIDNEY DISEASE, WITH LONG-TERM CURRENT USE OF INSULIN (MULTI): ICD-10-CM

## 2024-04-26 DIAGNOSIS — N18.31 STAGE 3A CHRONIC KIDNEY DISEASE (MULTI): ICD-10-CM

## 2024-04-26 DIAGNOSIS — Z79.4 TYPE 2 DIABETES MELLITUS WITH STAGE 3A CHRONIC KIDNEY DISEASE, WITH LONG-TERM CURRENT USE OF INSULIN (MULTI): ICD-10-CM

## 2024-04-26 DIAGNOSIS — Z11.59 ENCOUNTER FOR HEPATITIS C SCREENING TEST FOR LOW RISK PATIENT: ICD-10-CM

## 2024-04-26 DIAGNOSIS — E55.9 VITAMIN D DEFICIENCY: ICD-10-CM

## 2024-04-26 DIAGNOSIS — I10 ESSENTIAL HYPERTENSION: ICD-10-CM

## 2024-04-26 DIAGNOSIS — E11.22 TYPE 2 DIABETES MELLITUS WITH STAGE 3A CHRONIC KIDNEY DISEASE, WITH LONG-TERM CURRENT USE OF INSULIN (MULTI): ICD-10-CM

## 2024-04-26 DIAGNOSIS — D63.8 ANEMIA, CHRONIC DISEASE: ICD-10-CM

## 2024-04-26 DIAGNOSIS — E79.0 HYPERURICEMIA: ICD-10-CM

## 2024-04-26 DIAGNOSIS — E78.2 MIXED HYPERLIPIDEMIA: ICD-10-CM

## 2024-04-26 LAB
25(OH)D3 SERPL-MCNC: 15 NG/ML (ref 30–100)
ALBUMIN SERPL BCP-MCNC: 4.1 G/DL (ref 3.4–5)
ALP SERPL-CCNC: 83 U/L (ref 33–136)
ALT SERPL W P-5'-P-CCNC: 27 U/L (ref 10–52)
ANION GAP SERPL CALC-SCNC: 13 MMOL/L (ref 10–20)
APPEARANCE UR: CLEAR
AST SERPL W P-5'-P-CCNC: 19 U/L (ref 9–39)
BASOPHILS # BLD AUTO: 0.07 X10*3/UL (ref 0–0.1)
BASOPHILS NFR BLD AUTO: 0.8 %
BILIRUB SERPL-MCNC: 0.4 MG/DL (ref 0–1.2)
BILIRUB UR STRIP.AUTO-MCNC: NEGATIVE MG/DL
BUN SERPL-MCNC: 29 MG/DL (ref 6–23)
CALCIUM SERPL-MCNC: 9.7 MG/DL (ref 8.6–10.3)
CHLORIDE SERPL-SCNC: 100 MMOL/L (ref 98–107)
CHOLEST SERPL-MCNC: 178 MG/DL (ref 0–199)
CHOLESTEROL/HDL RATIO: 4.5
CO2 SERPL-SCNC: 28 MMOL/L (ref 21–32)
COLOR UR: YELLOW
CREAT SERPL-MCNC: 1.3 MG/DL (ref 0.5–1.3)
CREAT UR-MCNC: 125 MG/DL (ref 20–370)
EGFRCR SERPLBLD CKD-EPI 2021: 59 ML/MIN/1.73M*2
EOSINOPHIL # BLD AUTO: 0.17 X10*3/UL (ref 0–0.7)
EOSINOPHIL NFR BLD AUTO: 1.9 %
ERYTHROCYTE [DISTWIDTH] IN BLOOD BY AUTOMATED COUNT: 12.8 % (ref 11.5–14.5)
EST. AVERAGE GLUCOSE BLD GHB EST-MCNC: 232 MG/DL
FERRITIN SERPL-MCNC: 123 NG/ML (ref 20–300)
FOLATE SERPL-MCNC: 13.3 NG/ML
GLUCOSE SERPL-MCNC: 201 MG/DL (ref 74–99)
GLUCOSE UR STRIP.AUTO-MCNC: ABNORMAL MG/DL
HBA1C MFR BLD: 9.7 %
HCT VFR BLD AUTO: 41.6 % (ref 41–52)
HCV AB SER QL: NONREACTIVE
HDLC SERPL-MCNC: 39.3 MG/DL
HGB BLD-MCNC: 14.1 G/DL (ref 13.5–17.5)
HOLD SPECIMEN: NORMAL
IMM GRANULOCYTES # BLD AUTO: 0.02 X10*3/UL (ref 0–0.7)
IMM GRANULOCYTES NFR BLD AUTO: 0.2 % (ref 0–0.9)
IRON SATN MFR SERPL: 25 % (ref 25–45)
IRON SERPL-MCNC: 79 UG/DL (ref 35–150)
KETONES UR STRIP.AUTO-MCNC: NEGATIVE MG/DL
LDLC SERPL CALC-MCNC: 78 MG/DL
LEUKOCYTE ESTERASE UR QL STRIP.AUTO: NEGATIVE
LYMPHOCYTES # BLD AUTO: 3.01 X10*3/UL (ref 1.2–4.8)
LYMPHOCYTES NFR BLD AUTO: 33.7 %
MAGNESIUM SERPL-MCNC: 1.8 MG/DL (ref 1.6–2.4)
MCH RBC QN AUTO: 29.6 PG (ref 26–34)
MCHC RBC AUTO-ENTMCNC: 33.9 G/DL (ref 32–36)
MCV RBC AUTO: 87 FL (ref 80–100)
MICROALBUMIN UR-MCNC: 1492.1 MG/L
MICROALBUMIN/CREAT UR: 1193.7 UG/MG CREAT
MONOCYTES # BLD AUTO: 0.64 X10*3/UL (ref 0.1–1)
MONOCYTES NFR BLD AUTO: 7.2 %
MUCOUS THREADS #/AREA URNS AUTO: NORMAL /LPF
NEUTROPHILS # BLD AUTO: 5.03 X10*3/UL (ref 1.2–7.7)
NEUTROPHILS NFR BLD AUTO: 56.2 %
NITRITE UR QL STRIP.AUTO: NEGATIVE
NON HDL CHOLESTEROL: 139 MG/DL (ref 0–149)
NRBC BLD-RTO: 0 /100 WBCS (ref 0–0)
PH UR STRIP.AUTO: 5 [PH]
PLATELET # BLD AUTO: 248 X10*3/UL (ref 150–450)
POTASSIUM SERPL-SCNC: 3.9 MMOL/L (ref 3.5–5.3)
PROT SERPL-MCNC: 7.4 G/DL (ref 6.4–8.2)
PROT UR STRIP.AUTO-MCNC: ABNORMAL MG/DL
RBC # BLD AUTO: 4.77 X10*6/UL (ref 4.5–5.9)
RBC # UR STRIP.AUTO: NEGATIVE /UL
RBC #/AREA URNS AUTO: NORMAL /HPF
SODIUM SERPL-SCNC: 137 MMOL/L (ref 136–145)
SP GR UR STRIP.AUTO: 1.02
TIBC SERPL-MCNC: 312 UG/DL (ref 240–445)
TRIGL SERPL-MCNC: 303 MG/DL (ref 0–149)
TSH SERPL-ACNC: 1.35 MIU/L (ref 0.44–3.98)
UIBC SERPL-MCNC: 233 UG/DL (ref 110–370)
URATE SERPL-MCNC: 6.5 MG/DL (ref 4–7.5)
UROBILINOGEN UR STRIP.AUTO-MCNC: <2 MG/DL
VIT B12 SERPL-MCNC: 199 PG/ML (ref 211–911)
VLDL: 61 MG/DL (ref 0–40)
WBC # BLD AUTO: 8.9 X10*3/UL (ref 4.4–11.3)
WBC #/AREA URNS AUTO: NORMAL /HPF

## 2024-04-26 PROCEDURE — 85025 COMPLETE CBC W/AUTO DIFF WBC: CPT

## 2024-04-26 PROCEDURE — 84443 ASSAY THYROID STIM HORMONE: CPT

## 2024-04-26 PROCEDURE — 83540 ASSAY OF IRON: CPT

## 2024-04-26 PROCEDURE — 82570 ASSAY OF URINE CREATININE: CPT

## 2024-04-26 PROCEDURE — 86803 HEPATITIS C AB TEST: CPT

## 2024-04-26 PROCEDURE — 83550 IRON BINDING TEST: CPT

## 2024-04-26 PROCEDURE — 82043 UR ALBUMIN QUANTITATIVE: CPT

## 2024-04-26 PROCEDURE — 83735 ASSAY OF MAGNESIUM: CPT

## 2024-04-26 PROCEDURE — 36415 COLL VENOUS BLD VENIPUNCTURE: CPT

## 2024-04-26 PROCEDURE — 80061 LIPID PANEL: CPT

## 2024-04-26 PROCEDURE — 82607 VITAMIN B-12: CPT

## 2024-04-26 PROCEDURE — 80053 COMPREHEN METABOLIC PANEL: CPT

## 2024-04-26 PROCEDURE — 82306 VITAMIN D 25 HYDROXY: CPT

## 2024-04-26 PROCEDURE — 83036 HEMOGLOBIN GLYCOSYLATED A1C: CPT

## 2024-04-26 PROCEDURE — 82728 ASSAY OF FERRITIN: CPT

## 2024-04-26 PROCEDURE — 84550 ASSAY OF BLOOD/URIC ACID: CPT

## 2024-04-26 PROCEDURE — 81001 URINALYSIS AUTO W/SCOPE: CPT

## 2024-04-26 PROCEDURE — 82746 ASSAY OF FOLIC ACID SERUM: CPT

## 2024-05-17 ENCOUNTER — OFFICE VISIT (OUTPATIENT)
Dept: PRIMARY CARE | Facility: CLINIC | Age: 69
End: 2024-05-17
Payer: COMMERCIAL

## 2024-05-17 VITALS
HEIGHT: 71 IN | BODY MASS INDEX: 31.36 KG/M2 | OXYGEN SATURATION: 94 % | DIASTOLIC BLOOD PRESSURE: 88 MMHG | HEART RATE: 87 BPM | SYSTOLIC BLOOD PRESSURE: 163 MMHG | WEIGHT: 224 LBS

## 2024-05-17 DIAGNOSIS — N18.31 STAGE 3A CHRONIC KIDNEY DISEASE (MULTI): ICD-10-CM

## 2024-05-17 DIAGNOSIS — Z79.4 TYPE 2 DIABETES MELLITUS WITH STAGE 3A CHRONIC KIDNEY DISEASE, WITH LONG-TERM CURRENT USE OF INSULIN (MULTI): ICD-10-CM

## 2024-05-17 DIAGNOSIS — I10 ESSENTIAL HYPERTENSION: Primary | ICD-10-CM

## 2024-05-17 DIAGNOSIS — E11.22 TYPE 2 DIABETES MELLITUS WITH STAGE 3A CHRONIC KIDNEY DISEASE, WITH LONG-TERM CURRENT USE OF INSULIN (MULTI): ICD-10-CM

## 2024-05-17 DIAGNOSIS — E79.0 HYPERURICEMIA: ICD-10-CM

## 2024-05-17 DIAGNOSIS — R80.9 MICROALBUMINURIA: ICD-10-CM

## 2024-05-17 DIAGNOSIS — N18.31 TYPE 2 DIABETES MELLITUS WITH STAGE 3A CHRONIC KIDNEY DISEASE, WITH LONG-TERM CURRENT USE OF INSULIN (MULTI): ICD-10-CM

## 2024-05-17 DIAGNOSIS — E55.9 VITAMIN D DEFICIENCY: ICD-10-CM

## 2024-05-17 DIAGNOSIS — E78.2 MIXED HYPERLIPIDEMIA: ICD-10-CM

## 2024-05-17 DIAGNOSIS — Z91.199 NONCOMPLIANCE: ICD-10-CM

## 2024-05-17 DIAGNOSIS — E11.42 DIABETIC POLYNEUROPATHY ASSOCIATED WITH TYPE 2 DIABETES MELLITUS (MULTI): ICD-10-CM

## 2024-05-17 DIAGNOSIS — Z91.89 FRAMINGHAM CARDIAC RISK >20% IN NEXT 10 YEARS: Chronic | ICD-10-CM

## 2024-05-17 DIAGNOSIS — E66.09 CLASS 1 OBESITY DUE TO EXCESS CALORIES WITH SERIOUS COMORBIDITY AND BODY MASS INDEX (BMI) OF 30.0 TO 30.9 IN ADULT: ICD-10-CM

## 2024-05-17 DIAGNOSIS — D51.9 ANEMIA DUE TO VITAMIN B12 DEFICIENCY, UNSPECIFIED B12 DEFICIENCY TYPE: ICD-10-CM

## 2024-05-17 PROCEDURE — 99214 OFFICE O/P EST MOD 30 MIN: CPT | Performed by: INTERNAL MEDICINE

## 2024-05-17 PROCEDURE — 3079F DIAST BP 80-89 MM HG: CPT | Performed by: INTERNAL MEDICINE

## 2024-05-17 PROCEDURE — 3062F POS MACROALBUMINURIA REV: CPT | Performed by: INTERNAL MEDICINE

## 2024-05-17 PROCEDURE — 4010F ACE/ARB THERAPY RXD/TAKEN: CPT | Performed by: INTERNAL MEDICINE

## 2024-05-17 PROCEDURE — 3046F HEMOGLOBIN A1C LEVEL >9.0%: CPT | Performed by: INTERNAL MEDICINE

## 2024-05-17 PROCEDURE — 1160F RVW MEDS BY RX/DR IN RCRD: CPT | Performed by: INTERNAL MEDICINE

## 2024-05-17 PROCEDURE — 3008F BODY MASS INDEX DOCD: CPT | Performed by: INTERNAL MEDICINE

## 2024-05-17 PROCEDURE — 1159F MED LIST DOCD IN RCRD: CPT | Performed by: INTERNAL MEDICINE

## 2024-05-17 PROCEDURE — 3048F LDL-C <100 MG/DL: CPT | Performed by: INTERNAL MEDICINE

## 2024-05-17 PROCEDURE — 3077F SYST BP >= 140 MM HG: CPT | Performed by: INTERNAL MEDICINE

## 2024-05-17 PROCEDURE — 1036F TOBACCO NON-USER: CPT | Performed by: INTERNAL MEDICINE

## 2024-05-17 PROCEDURE — 96372 THER/PROPH/DIAG INJ SC/IM: CPT | Performed by: INTERNAL MEDICINE

## 2024-05-17 RX ORDER — LISINOPRIL 20 MG/1
TABLET ORAL
Qty: 120 TABLET | Refills: 0 | Status: SHIPPED | OUTPATIENT
Start: 2024-05-17

## 2024-05-17 RX ORDER — CYANOCOBALAMIN 1000 UG/ML
1000 INJECTION, SOLUTION INTRAMUSCULAR; SUBCUTANEOUS ONCE
Status: COMPLETED | OUTPATIENT
Start: 2024-05-17 | End: 2024-05-17

## 2024-05-17 RX ORDER — ACETAMINOPHEN, DEXTROMETHORPHAN HBR, DOXYLAMINE SUCCINATE, PHENYLEPHRINE HCL 650; 20; 12.5; 1 MG/30ML; MG/30ML; MG/30ML; MG/30ML
SOLUTION ORAL
Qty: 180 TABLET | Refills: 3 | Status: SHIPPED | OUTPATIENT
Start: 2024-05-17

## 2024-05-17 RX ORDER — ERGOCALCIFEROL 1.25 MG/1
CAPSULE ORAL
Qty: 13 CAPSULE | Refills: 1 | Status: SHIPPED | OUTPATIENT
Start: 2024-05-17

## 2024-05-17 RX ADMIN — CYANOCOBALAMIN 1000 MCG: 1000 INJECTION, SOLUTION INTRAMUSCULAR; SUBCUTANEOUS at 11:23

## 2024-05-17 NOTE — PROGRESS NOTES
Subjective   Patient ID: Markos Interiano is a 68 y.o. male who presents for 2 Week BP FU (Pt in today for routine 2 week BP FU).    HPI   Daughter in attendance, making sure that the patient is being more compliant.  She would like a list of his medications to review with them at home and make sure that he has his medications available to him.    In the meantime, laboratory examinations done in April reviewed.  Vitamin B12 still needs to be replenished.  Daughter in turn pointing out that he has been having some ataxia, paresthesias, polyarthralgia.  Fortunately, no falls.  No headache, blurred vision, diplopia.  No dysphagia.    Seen recently by ENDOCRINOLOGY, with hemoglobin A1c noted at 9.7.  Insulins adjusted, with patient stating that yes, he has been more compliant with medications.  In turn, they are requesting for advice regarding diet.  They are willing to see a DIETITIAN for further advice at this time, and in the meantime, are willing to get more advice from the [place] diet book.  ENDOCRINE with no polyuria, polydipsia, polyphagia.  No blurred vision.  No skin, hair, nail changes.  No dramatic weight loss or weight gain.      Likewise, being followed by OPHTHALMOLOGY, will need appointment soon.  Also being followed by PODIATRY, also due for reevaluation.    Noted to have elevated blood pressure.  Otherwise, denies any symptoms referable to hemodynamic instability, with no landon substernal chest pain, no orthopnea, no paroxysmal nocturnal dyspnea.  Likewise, no particular cough, no particular sputum production.  CONSTITUTIONALLY, no fever, no chills.  No night sweats.  No lingering anorexia or nausea.  No apparent lymphadenopathy.  No apparent weight loss.        Review of Systems  Review of systems as in history of present illness, and otherwise, reviewed separately as well, and was unremarkable/negative/noncontributory.        Objective   /88 (BP Location: Left arm, Patient Position:  "Sitting)   Pulse 87   Ht 1.803 m (5' 11\")   Wt 102 kg (224 lb)   SpO2 94%   BMI 31.24 kg/m²     Physical Exam  In otherwise good spirits.  Not in distress or diaphoresis.  Alert, oriented x 3.  Amiable.  Not unkempt.  Receptive, cheerful, appropriate, and eager to improve quality of life.  Eager to be more compliant.  Not wishing harm to self or others.  Moderately built.  Appropriate.    HEAD pink palpebral conjunctivae, anicteric sclerae.  NECK supple, no apparent jugular venous distention.  CARDIOVASCULAR not in distress or diaphoresis.  No bipedal edema.  LUNGS not in distress or diaphoresis.  Not using accessory muscles.  ABDOMEN soft, nontender.  BACK no costovertebral angle tenderness.  EXTREMITIES no clubbing, no cyanosis.  NEURO no facial asymmetry.  No apparent cranial nerve deficits.  Ambulating without need of assistance.  No apparent focal weakness.  No tremors.  PSYCH receptive, appropriate, and eager to maintain and improve quality of life.        LABORATORY results reviewed with patient.        Assessment/Plan   Diagnoses and all orders for this visit:  Essential hypertension  -     Referral to Nutrition Services; Future  -     Follow Up In Primary Care - Established; Future  -     lisinopril 20 mg tablet; Please take 2 tablets by mouth with SUPPER every evening.  Thank you.  Type 2 diabetes mellitus with stage 3a chronic kidney disease, with long-term current use of insulin (Multi)  -     Referral to Nutrition Services; Future  -     Follow Up In Primary Care - Established; Future  -     lisinopril 20 mg tablet; Please take 2 tablets by mouth with SUPPER every evening.  Thank you.  Mixed hyperlipidemia  -     Referral to Nutrition Services; Future  -     Follow Up In Primary Care - Established; Future  [place] cardiac risk >20% in next 10 years  Comments:  49.5% 05/24  Orders:  -     Follow Up In Primary Care - Established; Future  Stage 3a chronic kidney disease (Multi)  -     Referral to " Nutrition Services; Future  -     Follow Up In Primary Care - hospitals; Future  -     lisinopril 20 mg tablet; Please take 2 tablets by mouth with SUPPER every evening.  Thank you.  Microalbuminuria  -     Follow Up In Primary Care - hospitals; Future  Vitamin D deficiency  -     ergocalciferol (Vitamin D-2) 1.25 MG (64988 UT) capsule; TAKE 1 CAPSULE BY MOUTH ONCE WEEKLY ON SUNDAY WITH LUNCH AND A FULL GLASS OF WATER  -     Follow Up In Primary Care - hospitals; Future  Anemia due to vitamin B12 deficiency, unspecified B12 deficiency type  -     cyanocobalamin (Vitamin B-12) injection 1,000 mcg  -     Referral to Nutrition Services; Future  -     cyanocobalamin, vitamin B-12, (Vitamin B-12) 1,000 mcg tablet extended release; Please take 1 tablet with breakfast, and again 1 tablet with lunch.  Do not take with supper.  Thank you.  -     Follow Up In Primary Care - hospitals; Future  Diabetic polyneuropathy associated with type 2 diabetes mellitus (Multi)  -     cyanocobalamin (Vitamin B-12) injection 1,000 mcg  -     cyanocobalamin, vitamin B-12, (Vitamin B-12) 1,000 mcg tablet extended release; Please take 1 tablet with breakfast, and again 1 tablet with lunch.  Do not take with supper.  Thank you.  -     Follow Up In Primary Care - hospitals; Future  Hyperuricemia  -     Referral to Nutrition Services; Future  -     Follow Up In Primary Care - hospitals; Future  Class 1 obesity due to excess calories with serious comorbidity and body mass index (BMI) of 30.0 to 30.9 in adult  -     Referral to Nutrition Services; Future  -     Follow Up In Primary Care - hospitals; Future  Noncompliance  -     Follow Up In Primary Care - hospitals  -     Follow Up In Primary Care - hospitals; Future       Thank you much for coming.  I am very happy to see you.    Thank you for doing your FASTING laboratory examinations last APRIL.  Your kidney function is stable.  Your liver is stable.  Your hemoglobin A1c  DIABETES marker is high, but I am glad to hear that you are being seen by Dr. Jameson, ENDOCRINOLOGY, and that he has adjusted your INSULIN.  Please follow-up with him closely.    I do understand that you would like more guidance with your DIET.  Let us have you see a DIETITIAN, especially since you are diabetic, and your cholesterol, including your TRIGLYCERIDES are high.  Very important.  Until you are seen, get yourself a copy of the [place] diet book.  There are a lot of very good ideas in this book.  It is also not expensive.  You can even just borrow it from the library if you would like to do that for now.    Your blood pressure is high.  Please continue following with your kidney specialist, Dr. Patel.  His specialty is controlling blood pressure!  I reviewed your regimens, and you do have medications for your blood pressure.  Please make sure that you are taking these, and call me if you need any refills:  AMLODIPINE 10 mg by mouth every day.  CHLORTHALIDONE 50 mg by mouth with breakfast every day, together with your POTASSIUM.  HYDRALAZINE 50 mg 3 times a day.  ISOSORBIDE 60 mg every day.  LISINOPRIL 20 mg, take 2 tablets by mouth with supper every day.  METOPROLOL 100 mg with supper.    If you are missing any of these medications, please let me know, and I will refill them.  Again, keep your appointments with Dr. Patel for any further adjustments.    Also, since you are DIABETIC, you should always take your ATORVASTATIN at bedtime.  Let me know if you are running low.    You will benefit from a VITAMIN B12 injection now, then take your VITAMIN B12 supplement with breakfast, and again with lunch.  Please take twice a day even if you are not going to eat.  Also, make sure that you are taking a MULTIVITAMIN like Centrum Silver with BREAKFAST every day.  Very important, very helpful.    Please make sure you are taking your VITAMIN D once a week.  I have also refilled this prescription.    Please come back  in 2 months, so that I can make sure that you are gaining ground!    Please continue to follow closely with your FOOT doctor, Dionna Collins.  I gave you her number.  Please continue seeing your EYE SPECIALIST as well, very important.  Again, continue seeing Dr. Jameson, DIABETES specialist.  Please continue seeing Dr. Patel, KIDNEY specialist and blood pressure specialist.    Please come back in 2 months.  Call me sooner with any questions or concerns.    Get yourself vaccinated for SHINGLES with the SHINGRIX vaccination which comes in 2 injections at least 1 month apart.  Call your local friendly pharmacy to take care of this.    Please review this outline with your wife, and get things done!  I am glad that you are both here.  Please continue to take care of each other, and please continue to pray for our recovery from this pandemic.  I do wish you a good spring and summertime!  See you in 2 months.            0  Return in 2 months.  20 minutes please.  Reassess compliance, tolerance, hemodynamics, cardiovascular risk.  Review preventive strategies.  Coordinate with endocrinology, nephrology, podiatry, ophthalmology.  Consider repeat laboratory examinations as appropriate.            0

## 2024-05-17 NOTE — PATIENT INSTRUCTIONS
Thank you much for coming.  I am very happy to see you.    Thank you for doing your FASTING laboratory examinations last APRIL.  Your kidney function is stable.  Your liver is stable.  Your hemoglobin A1c DIABETES marker is high, but I am glad to hear that you are being seen by Dr. Jameson, ENDOCRINOLOGY, and that he has adjusted your INSULIN.  Please follow-up with him closely.    I do understand that you would like more guidance with your DIET.  Let us have you see a DIETITIAN, especially since you are diabetic, and your cholesterol, including your TRIGLYCERIDES are high.  Very important.  Until you are seen, get yourself a copy of the [place] diet book.  There are a lot of very good ideas in this book.  It is also not expensive.  You can even just borrow it from the library if you would like to do that for now.    Your blood pressure is high.  Please continue following with your kidney specialist, Dr. Patel.  His specialty is controlling blood pressure!  I reviewed your regimens, and you do have medications for your blood pressure.  Please make sure that you are taking these, and call me if you need any refills:  AMLODIPINE 10 mg by mouth every day.  CHLORTHALIDONE 50 mg by mouth with breakfast every day, together with your POTASSIUM.  HYDRALAZINE 50 mg 3 times a day.  ISOSORBIDE 60 mg every day.  LISINOPRIL 20 mg, take 2 tablets by mouth with supper every day.  METOPROLOL 100 mg with supper.    If you are missing any of these medications, please let me know, and I will refill them.  Again, keep your appointments with Dr. Patel for any further adjustments.    Also, since you are DIABETIC, you should always take your ATORVASTATIN at bedtime.  Let me know if you are running low.    You will benefit from a VITAMIN B12 injection now, then take your VITAMIN B12 supplement with breakfast, and again with lunch.  Please take twice a day even if you are not going to eat.  Also, make sure that you are taking a  MULTIVITAMIN like Centrum Silver with BREAKFAST every day.  Very important, very helpful.    Please make sure you are taking your VITAMIN D once a week.  I have also refilled this prescription.    Please come back in 2 months, so that I can make sure that you are gaining ground!    Please continue to follow closely with your FOOT doctor, Dionna Collins.  I gave you her number.  Please continue seeing your EYE SPECIALIST as well, very important.  Again, continue seeing Dr. Jameson, DIABETES specialist.  Please continue seeing Dr. Patel, KIDNEY specialist and blood pressure specialist.    Please come back in 2 months.  Call me sooner with any questions or concerns.    Get yourself vaccinated for SHINGLES with the SHINGRIX vaccination which comes in 2 injections at least 1 month apart.  Call your local friendly pharmacy to take care of this.    Please review this outline with your wife, and get things done!  I am glad that you are both here.  Please continue to take care of each other, and please continue to pray for our recovery from this pandemic.  I do wish you a good spring and summertime!  See you in 2 months.            0  Return in 2 months.  20 minutes please.  Reassess compliance, tolerance, hemodynamics, cardiovascular risk.  Review preventive strategies.  Coordinate with endocrinology, nephrology, podiatry, ophthalmology.  Consider repeat laboratory examinations as appropriate.            0

## 2024-07-11 ENCOUNTER — APPOINTMENT (OUTPATIENT)
Dept: NUTRITION | Facility: CLINIC | Age: 69
End: 2024-07-11
Payer: COMMERCIAL

## 2024-07-12 ENCOUNTER — APPOINTMENT (OUTPATIENT)
Dept: PRIMARY CARE | Facility: CLINIC | Age: 69
End: 2024-07-12
Payer: COMMERCIAL

## 2024-07-12 ENCOUNTER — DOCUMENTATION (OUTPATIENT)
Dept: PRIMARY CARE | Facility: CLINIC | Age: 69
End: 2024-07-12

## 2024-07-12 VITALS
HEIGHT: 71 IN | OXYGEN SATURATION: 95 % | BODY MASS INDEX: 31.69 KG/M2 | DIASTOLIC BLOOD PRESSURE: 85 MMHG | WEIGHT: 226.4 LBS | SYSTOLIC BLOOD PRESSURE: 159 MMHG | HEART RATE: 70 BPM

## 2024-07-12 DIAGNOSIS — E11.22 TYPE 2 DIABETES MELLITUS WITH STAGE 3A CHRONIC KIDNEY DISEASE, WITH LONG-TERM CURRENT USE OF INSULIN (MULTI): ICD-10-CM

## 2024-07-12 DIAGNOSIS — Z91.89 FRAMINGHAM CARDIAC RISK >20% IN NEXT 10 YEARS: Chronic | ICD-10-CM

## 2024-07-12 DIAGNOSIS — N18.31 STAGE 3A CHRONIC KIDNEY DISEASE (MULTI): ICD-10-CM

## 2024-07-12 DIAGNOSIS — Z79.4 TYPE 2 DIABETES MELLITUS WITH STAGE 3A CHRONIC KIDNEY DISEASE, WITH LONG-TERM CURRENT USE OF INSULIN (MULTI): ICD-10-CM

## 2024-07-12 DIAGNOSIS — E78.2 MIXED HYPERLIPIDEMIA: ICD-10-CM

## 2024-07-12 DIAGNOSIS — E11.42 DIABETIC POLYNEUROPATHY ASSOCIATED WITH TYPE 2 DIABETES MELLITUS (MULTI): ICD-10-CM

## 2024-07-12 DIAGNOSIS — D51.9 ANEMIA DUE TO VITAMIN B12 DEFICIENCY, UNSPECIFIED B12 DEFICIENCY TYPE: ICD-10-CM

## 2024-07-12 DIAGNOSIS — K21.9 GASTROESOPHAGEAL REFLUX DISEASE, UNSPECIFIED WHETHER ESOPHAGITIS PRESENT: ICD-10-CM

## 2024-07-12 DIAGNOSIS — E79.0 HYPERURICEMIA: ICD-10-CM

## 2024-07-12 DIAGNOSIS — R80.9 MICROALBUMINURIA: ICD-10-CM

## 2024-07-12 DIAGNOSIS — E55.9 VITAMIN D DEFICIENCY: ICD-10-CM

## 2024-07-12 DIAGNOSIS — Z91.199 NONCOMPLIANCE: Primary | ICD-10-CM

## 2024-07-12 DIAGNOSIS — I10 ESSENTIAL HYPERTENSION: ICD-10-CM

## 2024-07-12 DIAGNOSIS — N18.31 TYPE 2 DIABETES MELLITUS WITH STAGE 3A CHRONIC KIDNEY DISEASE, WITH LONG-TERM CURRENT USE OF INSULIN (MULTI): ICD-10-CM

## 2024-07-12 DIAGNOSIS — E66.09 CLASS 1 OBESITY DUE TO EXCESS CALORIES WITH SERIOUS COMORBIDITY AND BODY MASS INDEX (BMI) OF 31.0 TO 31.9 IN ADULT: ICD-10-CM

## 2024-07-12 PROCEDURE — 3046F HEMOGLOBIN A1C LEVEL >9.0%: CPT | Performed by: INTERNAL MEDICINE

## 2024-07-12 PROCEDURE — 4010F ACE/ARB THERAPY RXD/TAKEN: CPT | Performed by: INTERNAL MEDICINE

## 2024-07-12 PROCEDURE — 99214 OFFICE O/P EST MOD 30 MIN: CPT | Performed by: INTERNAL MEDICINE

## 2024-07-12 PROCEDURE — 3077F SYST BP >= 140 MM HG: CPT | Performed by: INTERNAL MEDICINE

## 2024-07-12 PROCEDURE — 3008F BODY MASS INDEX DOCD: CPT | Performed by: INTERNAL MEDICINE

## 2024-07-12 PROCEDURE — 1036F TOBACCO NON-USER: CPT | Performed by: INTERNAL MEDICINE

## 2024-07-12 PROCEDURE — 3062F POS MACROALBUMINURIA REV: CPT | Performed by: INTERNAL MEDICINE

## 2024-07-12 PROCEDURE — 3079F DIAST BP 80-89 MM HG: CPT | Performed by: INTERNAL MEDICINE

## 2024-07-12 PROCEDURE — 1159F MED LIST DOCD IN RCRD: CPT | Performed by: INTERNAL MEDICINE

## 2024-07-12 PROCEDURE — 1160F RVW MEDS BY RX/DR IN RCRD: CPT | Performed by: INTERNAL MEDICINE

## 2024-07-12 PROCEDURE — 3048F LDL-C <100 MG/DL: CPT | Performed by: INTERNAL MEDICINE

## 2024-07-12 RX ORDER — CHLORTHALIDONE 50 MG/1
50 TABLET ORAL DAILY
Qty: 90 TABLET | Refills: 0 | Status: SHIPPED | OUTPATIENT
Start: 2024-07-12

## 2024-07-12 RX ORDER — AMLODIPINE BESYLATE 10 MG/1
10 TABLET ORAL DAILY
Qty: 90 TABLET | Refills: 1 | Status: SHIPPED | OUTPATIENT
Start: 2024-07-12 | End: 2025-01-08

## 2024-07-12 RX ORDER — FAMOTIDINE 20 MG/1
TABLET, FILM COATED ORAL
Qty: 180 TABLET | Refills: 1 | Status: SHIPPED | OUTPATIENT
Start: 2024-07-12

## 2024-07-12 RX ORDER — ATORVASTATIN CALCIUM 40 MG/1
40 TABLET, FILM COATED ORAL NIGHTLY
Qty: 90 TABLET | Refills: 0 | Status: SHIPPED | OUTPATIENT
Start: 2024-07-12

## 2024-07-12 RX ORDER — ACETAMINOPHEN, DEXTROMETHORPHAN HBR, DOXYLAMINE SUCCINATE, PHENYLEPHRINE HCL 650; 20; 12.5; 1 MG/30ML; MG/30ML; MG/30ML; MG/30ML
SOLUTION ORAL
Qty: 180 TABLET | Refills: 3 | Status: SHIPPED | OUTPATIENT
Start: 2024-07-12

## 2024-07-12 RX ORDER — LISINOPRIL 20 MG/1
TABLET ORAL
Qty: 120 TABLET | Refills: 0 | Status: SHIPPED | OUTPATIENT
Start: 2024-07-12

## 2024-07-12 RX ORDER — ERGOCALCIFEROL 1.25 MG/1
CAPSULE ORAL
Qty: 13 CAPSULE | Refills: 0 | Status: SHIPPED | OUTPATIENT
Start: 2024-07-12

## 2024-07-12 RX ORDER — ISOSORBIDE MONONITRATE 60 MG/1
60 TABLET, EXTENDED RELEASE ORAL DAILY
Qty: 90 TABLET | Refills: 0 | Status: SHIPPED | OUTPATIENT
Start: 2024-07-12

## 2024-07-12 RX ORDER — HYDRALAZINE HYDROCHLORIDE 50 MG/1
50 TABLET, FILM COATED ORAL EVERY 8 HOURS
Qty: 270 TABLET | Refills: 0 | Status: SHIPPED | OUTPATIENT
Start: 2024-07-12

## 2024-07-12 RX ORDER — ALLOPURINOL 100 MG/1
100 TABLET ORAL DAILY
Qty: 90 TABLET | Refills: 0 | Status: SHIPPED | OUTPATIENT
Start: 2024-07-12

## 2024-07-12 NOTE — PATIENT INSTRUCTIONS
Thank you very much for coming.  I am very happy to see you again.    Unfortunately, you have not yet been able to follow the recommendations from your previous appointment.  I am glad to see your daughter, Brii, today!  She will be your primary caregiver and she will make sure that you take your medications properly.    Please have her get paperwork from work.  Contact HR, and get FMLA paperwork to allow her to take care of you and take off work as needed.    In the meantime, your blood pressure is UNCONTROLLED, and you do not recall your medications.  I have outlined and refilled them.  Please continue taking AMLODIPINE, CHLORTHALIDONE, HYDRALAZINE, ISOSORBIDE, LISINOPRIL, and METOPROLOL.  Please follow the instructions.  I know this is a lot of medication, but it is because your blood pressure has been very hard to control.    Please do FASTING laboratory examinations July 26 at any  facility, including the [facility] facility at [address] [place] [address] in [place].  Very important.  Get your lab work done, so that Dr. Jameson, ENDOCRINOLOGY, will be able to review the results.    I would like to see you again in about 1 month.  I will be able to check your blood pressure at that point, and I will do your Medicare Wellness evaluation for the year.    There is more to do, but let us first make sure that you are COMPLIANT with medications, then let us check your FASTING laboratory examinations, and let me see you to make sure that your blood pressure is controlled, and that we do your Medicare Wellness evaluation for the year and review your preventive strategies.    Until then, please continue to take care of yourself and each other, and please continue to pray for our recovery from this pandemic.  Regards to your daughters, especially Brii!  Take care and God bless.            0  Return in 1 month.  40 minutes please.  Reassess compliance, tolerance, blood pressure control, cardiovascular risk, preventive strategies.   Medicare Wellness evaluation.  Coordinate with ENDOCRINOLOGY, as well as specialists as patient is seen.  Reassess risk for ulcer.            0

## 2024-07-12 NOTE — PROGRESS NOTES
"Subjective   Patient ID: Markos Interiano is a 68 y.o. male who presents for Follow-up (Patient presented today for a 2 month follow up./).    HPI     Review of Systems    Objective   /85 (BP Location: Left arm, Patient Position: Sitting)   Pulse 70   Ht 1.803 m (5' 11\")   Wt 103 kg (226 lb 6.4 oz)   SpO2 95%   BMI 31.58 kg/m²     Physical Exam    Assessment/Plan   Diagnoses and all orders for this visit:  Noncompliance  -     Follow Up In Primary Care - Established  -     Follow Up In Primary Care - Established; Future  -     TSH with reflex to Free T4 if abnormal; Future  Essential hypertension  -     amLODIPine (Norvasc) 10 mg tablet; Take 1 tablet (10 mg) by mouth once daily.  -     chlorthalidone (Hygroton) 50 mg tablet; Take 1 tablet (50 mg) by mouth once daily. TAKE 1 TABLET Daily Please take with breakfast and a banana. Thank you.  -     hydrALAZINE (Apresoline) 50 mg tablet; Take 1 tablet (50 mg) by mouth every 8 hours.  -     isosorbide mononitrate ER (Imdur) 60 mg 24 hr tablet; Take 1 tablet (60 mg) by mouth once daily.  -     lisinopril 20 mg tablet; Please take 2 tablets by mouth with SUPPER every evening.  Thank you.  -     Follow Up In Primary Care - Established; Future  -     CBC and Auto Differential; Future  -     Comprehensive Metabolic Panel; Future  -     TSH with reflex to Free T4 if abnormal; Future  -     Magnesium; Future  -     Urinalysis with Reflex Culture and Microscopic; Future  Type 2 diabetes mellitus with stage 3a chronic kidney disease, with long-term current use of insulin (Multi)  -     atorvastatin (Lipitor) 40 mg tablet; Take 1 tablet (40 mg) by mouth once daily at bedtime.  -     lisinopril 20 mg tablet; Please take 2 tablets by mouth with SUPPER every evening.  Thank you.  -     Follow Up In Primary Care - Established; Future  -     Comprehensive Metabolic Panel; Future  -     Urinalysis with Reflex Culture and Microscopic; Future  -     Hemoglobin A1C; " Future  -     Albumin-Creatinine Ratio, Urine Random; Future  Mixed hyperlipidemia  -     atorvastatin (Lipitor) 40 mg tablet; Take 1 tablet (40 mg) by mouth once daily at bedtime.  -     Follow Up In Primary Care - Established; Future  -     Comprehensive Metabolic Panel; Future  -     Lipid Panel; Future  [place] cardiac risk >20% in next 10 years  Comments:  49.5% 05/24  Orders:  -     Follow Up In Primary Care - Established; Future  -     Comprehensive Metabolic Panel; Future  -     Hemoglobin A1C; Future  -     Lipid Panel; Future  Stage 3a chronic kidney disease (Multi)  -     lisinopril 20 mg tablet; Please take 2 tablets by mouth with SUPPER every evening.  Thank you.  -     Follow Up In Primary Care - Established; Future  -     CBC and Auto Differential; Future  -     Comprehensive Metabolic Panel; Future  -     Magnesium; Future  -     Urinalysis with Reflex Culture and Microscopic; Future  -     Albumin-Creatinine Ratio, Urine Random; Future  -     Creatine Kinase; Future  -     Uric Acid; Future  -     Ferritin; Future  Microalbuminuria  -     Follow Up In Primary Care - Established; Future  -     Urinalysis with Reflex Culture and Microscopic; Future  -     Albumin-Creatinine Ratio, Urine Random; Future  Vitamin D deficiency  -     ergocalciferol (Vitamin D-2) 1.25 MG (62517 UT) capsule; TAKE 1 CAPSULE BY MOUTH ONCE WEEKLY ON SUNDAY WITH LUNCH AND A FULL GLASS OF WATER  -     Follow Up In Primary Care - Established; Future  -     Comprehensive Metabolic Panel; Future  -     Vitamin D 25-Hydroxy,Total (for eval of Vitamin D levels); Future  Anemia due to vitamin B12 deficiency, unspecified B12 deficiency type  -     cyanocobalamin, vitamin B-12, (Vitamin B-12) 1,000 mcg tablet extended release; Please take 1 tablet with breakfast, and again 1 tablet with lunch. Do not take with supper. Thank you.  -     Follow Up In Primary Care - Established; Future  -     CBC and Auto Differential; Future  -      Urinalysis with Reflex Culture and Microscopic; Future  -     Vitamin B12; Future  -     Folate; Future  -     Ferritin; Future  -     Iron and TIBC; Future  Diabetic polyneuropathy associated with type 2 diabetes mellitus (Multi)  -     cyanocobalamin, vitamin B-12, (Vitamin B-12) 1,000 mcg tablet extended release; Please take 1 tablet with breakfast, and again 1 tablet with lunch. Do not take with supper. Thank you.  -     Follow Up In Primary Care - Established; Future  -     Comprehensive Metabolic Panel; Future  -     TSH with reflex to Free T4 if abnormal; Future  -     Hemoglobin A1C; Future  -     Creatine Kinase; Future  -     Uric Acid; Future  -     Vitamin B12; Future  -     Folate; Future  Hyperuricemia  -     allopurinol (Zyloprim) 100 mg tablet; Take 1 tablet (100 mg) by mouth once daily.  -     Follow Up In Primary Care - Established; Future  -     Comprehensive Metabolic Panel; Future  -     Uric Acid; Future  Class 1 obesity due to excess calories with serious comorbidity and body mass index (BMI) of 31.0 to 31.9 in adult  -     Follow Up In Primary Care - Established; Future  -     Comprehensive Metabolic Panel; Future  -     TSH with reflex to Free T4 if abnormal; Future  Gastroesophageal reflux disease, unspecified whether esophagitis present  -     famotidine (Pepcid) 20 mg tablet; TAKE 1 TABLET Twice daily PLEASE take before breakfast and before supper.  -     Follow Up In Primary Care - Established; Future  -     CBC and Auto Differential; Future       Thank you very much for coming.  I am very happy to see you again.    Unfortunately, you have not yet been able to follow the recommendations from your previous appointment.  I am glad to see your daughter, Brii, today!  She will be your primary caregiver and she will make sure that you take your medications properly.    Please have her get paperwork from work.  Contact HR, and get FMLA paperwork to allow her to take care of you and take off  work as needed.    In the meantime, your blood pressure is UNCONTROLLED, and you do not recall your medications.  I have outlined and refilled them.  Please continue taking AMLODIPINE, CHLORTHALIDONE, HYDRALAZINE, ISOSORBIDE, LISINOPRIL, and METOPROLOL.  Please follow the instructions.  I know this is a lot of medication, but it is because your blood pressure has been very hard to control.    Please do FASTING laboratory examinations July 26 at any  facility, including the [facility] facility at [address] [place] [address] in [place].  Very important.  Get your lab work done, so that Dr. Jameson, ENDOCRINOLOGY, will be able to review the results.    I would like to see you again in about 1 month.  I will be able to check your blood pressure at that point, and I will do your Medicare Wellness evaluation for the year.    There is more to do, but let us first make sure that you are COMPLIANT with medications, then let us check your FASTING laboratory examinations, and let me see you to make sure that your blood pressure is controlled, and that we do your Medicare Wellness evaluation for the year and review your preventive strategies.    Until then, please continue to take care of yourself and each other, and please continue to pray for our recovery from this pandemic.  Regards to your daughters, especially Brii!  Take care and God bless.            0  Return in 1 month.  40 minutes please.  Reassess compliance, tolerance, blood pressure control, cardiovascular risk, preventive strategies.  Medicare Wellness evaluation.  Coordinate with ENDOCRINOLOGY, as well as specialists as patient is seen.  Reassess risk for ulcer.            0

## 2024-07-29 ENCOUNTER — LAB (OUTPATIENT)
Dept: LAB | Facility: LAB | Age: 69
End: 2024-07-29
Payer: COMMERCIAL

## 2024-07-29 DIAGNOSIS — E11.42 DIABETIC POLYNEUROPATHY ASSOCIATED WITH TYPE 2 DIABETES MELLITUS (MULTI): ICD-10-CM

## 2024-07-29 DIAGNOSIS — E55.9 VITAMIN D DEFICIENCY: ICD-10-CM

## 2024-07-29 DIAGNOSIS — Z79.4 TYPE 2 DIABETES MELLITUS WITH STAGE 3A CHRONIC KIDNEY DISEASE, WITH LONG-TERM CURRENT USE OF INSULIN (MULTI): ICD-10-CM

## 2024-07-29 DIAGNOSIS — R80.9 MICROALBUMINURIA: ICD-10-CM

## 2024-07-29 DIAGNOSIS — E66.09 CLASS 1 OBESITY DUE TO EXCESS CALORIES WITH SERIOUS COMORBIDITY AND BODY MASS INDEX (BMI) OF 31.0 TO 31.9 IN ADULT: ICD-10-CM

## 2024-07-29 DIAGNOSIS — K21.9 GASTROESOPHAGEAL REFLUX DISEASE, UNSPECIFIED WHETHER ESOPHAGITIS PRESENT: ICD-10-CM

## 2024-07-29 DIAGNOSIS — E78.2 MIXED HYPERLIPIDEMIA: ICD-10-CM

## 2024-07-29 DIAGNOSIS — E79.0 HYPERURICEMIA: ICD-10-CM

## 2024-07-29 DIAGNOSIS — I10 ESSENTIAL HYPERTENSION: ICD-10-CM

## 2024-07-29 DIAGNOSIS — Z91.89 FRAMINGHAM CARDIAC RISK >20% IN NEXT 10 YEARS: Chronic | ICD-10-CM

## 2024-07-29 DIAGNOSIS — Z91.199 NONCOMPLIANCE: ICD-10-CM

## 2024-07-29 DIAGNOSIS — N18.31 STAGE 3A CHRONIC KIDNEY DISEASE (MULTI): ICD-10-CM

## 2024-07-29 DIAGNOSIS — E11.22 TYPE 2 DIABETES MELLITUS WITH STAGE 3A CHRONIC KIDNEY DISEASE, WITH LONG-TERM CURRENT USE OF INSULIN (MULTI): ICD-10-CM

## 2024-07-29 DIAGNOSIS — D51.9 ANEMIA DUE TO VITAMIN B12 DEFICIENCY, UNSPECIFIED B12 DEFICIENCY TYPE: ICD-10-CM

## 2024-07-29 DIAGNOSIS — N18.31 TYPE 2 DIABETES MELLITUS WITH STAGE 3A CHRONIC KIDNEY DISEASE, WITH LONG-TERM CURRENT USE OF INSULIN (MULTI): ICD-10-CM

## 2024-07-29 LAB
25(OH)D3 SERPL-MCNC: 30 NG/ML (ref 30–100)
ALBUMIN SERPL BCP-MCNC: 4.1 G/DL (ref 3.4–5)
ALP SERPL-CCNC: 60 U/L (ref 33–136)
ALT SERPL W P-5'-P-CCNC: 31 U/L (ref 10–52)
ANION GAP SERPL CALC-SCNC: 11 MMOL/L (ref 10–20)
APPEARANCE UR: CLEAR
AST SERPL W P-5'-P-CCNC: 23 U/L (ref 9–39)
BASOPHILS # BLD AUTO: 0.09 X10*3/UL (ref 0–0.1)
BASOPHILS NFR BLD AUTO: 1.3 %
BILIRUB SERPL-MCNC: 0.6 MG/DL (ref 0–1.2)
BILIRUB UR STRIP.AUTO-MCNC: NEGATIVE MG/DL
BUN SERPL-MCNC: 24 MG/DL (ref 6–23)
CALCIUM SERPL-MCNC: 9.3 MG/DL (ref 8.6–10.3)
CHLORIDE SERPL-SCNC: 103 MMOL/L (ref 98–107)
CHOLEST SERPL-MCNC: 147 MG/DL (ref 0–199)
CHOLESTEROL/HDL RATIO: 2.9
CK SERPL-CCNC: 427 U/L (ref 0–325)
CO2 SERPL-SCNC: 27 MMOL/L (ref 21–32)
COLOR UR: ABNORMAL
CREAT SERPL-MCNC: 1.37 MG/DL (ref 0.5–1.3)
CREAT UR-MCNC: 94.9 MG/DL (ref 20–370)
EGFRCR SERPLBLD CKD-EPI 2021: 56 ML/MIN/1.73M*2
EOSINOPHIL # BLD AUTO: 0.14 X10*3/UL (ref 0–0.7)
EOSINOPHIL NFR BLD AUTO: 2 %
ERYTHROCYTE [DISTWIDTH] IN BLOOD BY AUTOMATED COUNT: 13.1 % (ref 11.5–14.5)
EST. AVERAGE GLUCOSE BLD GHB EST-MCNC: 151 MG/DL
FERRITIN SERPL-MCNC: 88 NG/ML (ref 20–300)
FOLATE SERPL-MCNC: 17.6 NG/ML
GLUCOSE SERPL-MCNC: 159 MG/DL (ref 74–99)
GLUCOSE UR STRIP.AUTO-MCNC: NORMAL MG/DL
HBA1C MFR BLD: 6.9 %
HCT VFR BLD AUTO: 39.5 % (ref 41–52)
HDLC SERPL-MCNC: 50.4 MG/DL
HGB BLD-MCNC: 12.9 G/DL (ref 13.5–17.5)
HOLD SPECIMEN: NORMAL
IMM GRANULOCYTES # BLD AUTO: 0.02 X10*3/UL (ref 0–0.7)
IMM GRANULOCYTES NFR BLD AUTO: 0.3 % (ref 0–0.9)
IRON SATN MFR SERPL: 27 % (ref 25–45)
IRON SERPL-MCNC: 86 UG/DL (ref 35–150)
KETONES UR STRIP.AUTO-MCNC: NEGATIVE MG/DL
LDLC SERPL CALC-MCNC: 81 MG/DL
LEUKOCYTE ESTERASE UR QL STRIP.AUTO: NEGATIVE
LYMPHOCYTES # BLD AUTO: 2.37 X10*3/UL (ref 1.2–4.8)
LYMPHOCYTES NFR BLD AUTO: 34 %
MAGNESIUM SERPL-MCNC: 1.7 MG/DL (ref 1.6–2.4)
MCH RBC QN AUTO: 29.5 PG (ref 26–34)
MCHC RBC AUTO-ENTMCNC: 32.7 G/DL (ref 32–36)
MCV RBC AUTO: 90 FL (ref 80–100)
MICROALBUMIN UR-MCNC: 531.5 MG/L
MICROALBUMIN/CREAT UR: 560.1 UG/MG CREAT
MONOCYTES # BLD AUTO: 0.5 X10*3/UL (ref 0.1–1)
MONOCYTES NFR BLD AUTO: 7.2 %
MUCOUS THREADS #/AREA URNS AUTO: NORMAL /LPF
NEUTROPHILS # BLD AUTO: 3.85 X10*3/UL (ref 1.2–7.7)
NEUTROPHILS NFR BLD AUTO: 55.2 %
NITRITE UR QL STRIP.AUTO: NEGATIVE
NON HDL CHOLESTEROL: 97 MG/DL (ref 0–149)
NRBC BLD-RTO: 0 /100 WBCS (ref 0–0)
PH UR STRIP.AUTO: 5.5 [PH]
PLATELET # BLD AUTO: 247 X10*3/UL (ref 150–450)
POTASSIUM SERPL-SCNC: 4.1 MMOL/L (ref 3.5–5.3)
PROT SERPL-MCNC: 7.2 G/DL (ref 6.4–8.2)
PROT UR STRIP.AUTO-MCNC: ABNORMAL MG/DL
RBC # BLD AUTO: 4.38 X10*6/UL (ref 4.5–5.9)
RBC # UR STRIP.AUTO: NEGATIVE /UL
RBC #/AREA URNS AUTO: NORMAL /HPF
SODIUM SERPL-SCNC: 137 MMOL/L (ref 136–145)
SP GR UR STRIP.AUTO: 1.01
TIBC SERPL-MCNC: 324 UG/DL (ref 240–445)
TRIGL SERPL-MCNC: 77 MG/DL (ref 0–149)
TSH SERPL-ACNC: 1.78 MIU/L (ref 0.44–3.98)
UIBC SERPL-MCNC: 238 UG/DL (ref 110–370)
URATE SERPL-MCNC: 7.6 MG/DL (ref 4–7.5)
UROBILINOGEN UR STRIP.AUTO-MCNC: NORMAL MG/DL
VIT B12 SERPL-MCNC: 228 PG/ML (ref 211–911)
VLDL: 15 MG/DL (ref 0–40)
WBC # BLD AUTO: 7 X10*3/UL (ref 4.4–11.3)
WBC #/AREA URNS AUTO: NORMAL /HPF

## 2024-07-29 PROCEDURE — 83735 ASSAY OF MAGNESIUM: CPT

## 2024-07-29 PROCEDURE — 80053 COMPREHEN METABOLIC PANEL: CPT

## 2024-07-29 PROCEDURE — 84443 ASSAY THYROID STIM HORMONE: CPT

## 2024-07-29 PROCEDURE — 83540 ASSAY OF IRON: CPT

## 2024-07-29 PROCEDURE — 80061 LIPID PANEL: CPT

## 2024-07-29 PROCEDURE — 82043 UR ALBUMIN QUANTITATIVE: CPT

## 2024-07-29 PROCEDURE — 82607 VITAMIN B-12: CPT

## 2024-07-29 PROCEDURE — 36415 COLL VENOUS BLD VENIPUNCTURE: CPT

## 2024-07-29 PROCEDURE — 84550 ASSAY OF BLOOD/URIC ACID: CPT

## 2024-07-29 PROCEDURE — 81001 URINALYSIS AUTO W/SCOPE: CPT

## 2024-07-29 PROCEDURE — 85025 COMPLETE CBC W/AUTO DIFF WBC: CPT

## 2024-07-29 PROCEDURE — 82306 VITAMIN D 25 HYDROXY: CPT

## 2024-07-29 PROCEDURE — 82728 ASSAY OF FERRITIN: CPT

## 2024-07-29 PROCEDURE — 82746 ASSAY OF FOLIC ACID SERUM: CPT

## 2024-07-29 PROCEDURE — 83550 IRON BINDING TEST: CPT

## 2024-07-29 PROCEDURE — 82550 ASSAY OF CK (CPK): CPT

## 2024-07-29 PROCEDURE — 82570 ASSAY OF URINE CREATININE: CPT

## 2024-07-29 PROCEDURE — 83036 HEMOGLOBIN GLYCOSYLATED A1C: CPT

## 2024-08-30 ENCOUNTER — APPOINTMENT (OUTPATIENT)
Dept: PRIMARY CARE | Facility: CLINIC | Age: 69
End: 2024-08-30
Payer: COMMERCIAL

## 2024-09-24 ENCOUNTER — APPOINTMENT (OUTPATIENT)
Dept: PRIMARY CARE | Facility: CLINIC | Age: 69
End: 2024-09-24
Payer: COMMERCIAL

## 2024-09-24 VITALS
HEIGHT: 71 IN | BODY MASS INDEX: 31.08 KG/M2 | WEIGHT: 222 LBS | OXYGEN SATURATION: 95 % | SYSTOLIC BLOOD PRESSURE: 157 MMHG | DIASTOLIC BLOOD PRESSURE: 80 MMHG | HEART RATE: 70 BPM

## 2024-09-24 DIAGNOSIS — E11.22 TYPE 2 DIABETES MELLITUS WITH STAGE 3A CHRONIC KIDNEY DISEASE, WITH LONG-TERM CURRENT USE OF INSULIN (MULTI): ICD-10-CM

## 2024-09-24 DIAGNOSIS — I10 ESSENTIAL HYPERTENSION: ICD-10-CM

## 2024-09-24 DIAGNOSIS — Z00.00 ROUTINE GENERAL MEDICAL EXAMINATION AT HEALTH CARE FACILITY: Primary | ICD-10-CM

## 2024-09-24 DIAGNOSIS — E78.2 MIXED HYPERLIPIDEMIA: ICD-10-CM

## 2024-09-24 DIAGNOSIS — Z79.4 TYPE 2 DIABETES MELLITUS WITH STAGE 3A CHRONIC KIDNEY DISEASE, WITH LONG-TERM CURRENT USE OF INSULIN (MULTI): ICD-10-CM

## 2024-09-24 DIAGNOSIS — E55.9 VITAMIN D DEFICIENCY: ICD-10-CM

## 2024-09-24 DIAGNOSIS — R39.15 URGENCY OF URINATION: ICD-10-CM

## 2024-09-24 DIAGNOSIS — D51.9 ANEMIA DUE TO VITAMIN B12 DEFICIENCY, UNSPECIFIED B12 DEFICIENCY TYPE: ICD-10-CM

## 2024-09-24 DIAGNOSIS — Z91.89 FRAMINGHAM CARDIAC RISK >20% IN NEXT 10 YEARS: ICD-10-CM

## 2024-09-24 DIAGNOSIS — E79.0 HYPERURICEMIA: ICD-10-CM

## 2024-09-24 DIAGNOSIS — M60.19 INTERSTITIAL MYOSITIS OF MULTIPLE SITES: ICD-10-CM

## 2024-09-24 DIAGNOSIS — R80.9 MICROALBUMINURIA: ICD-10-CM

## 2024-09-24 DIAGNOSIS — E66.09 CLASS 1 OBESITY DUE TO EXCESS CALORIES WITH SERIOUS COMORBIDITY AND BODY MASS INDEX (BMI) OF 30.0 TO 30.9 IN ADULT: ICD-10-CM

## 2024-09-24 DIAGNOSIS — N18.31 TYPE 2 DIABETES MELLITUS WITH STAGE 3A CHRONIC KIDNEY DISEASE, WITH LONG-TERM CURRENT USE OF INSULIN (MULTI): ICD-10-CM

## 2024-09-24 PROCEDURE — 3062F POS MACROALBUMINURIA REV: CPT | Performed by: INTERNAL MEDICINE

## 2024-09-24 PROCEDURE — 1170F FXNL STATUS ASSESSED: CPT | Performed by: INTERNAL MEDICINE

## 2024-09-24 PROCEDURE — 1036F TOBACCO NON-USER: CPT | Performed by: INTERNAL MEDICINE

## 2024-09-24 PROCEDURE — 99213 OFFICE O/P EST LOW 20 MIN: CPT | Performed by: INTERNAL MEDICINE

## 2024-09-24 PROCEDURE — 4010F ACE/ARB THERAPY RXD/TAKEN: CPT | Performed by: INTERNAL MEDICINE

## 2024-09-24 PROCEDURE — 3048F LDL-C <100 MG/DL: CPT | Performed by: INTERNAL MEDICINE

## 2024-09-24 PROCEDURE — G0439 PPPS, SUBSEQ VISIT: HCPCS | Performed by: INTERNAL MEDICINE

## 2024-09-24 PROCEDURE — 3044F HG A1C LEVEL LT 7.0%: CPT | Performed by: INTERNAL MEDICINE

## 2024-09-24 PROCEDURE — 3008F BODY MASS INDEX DOCD: CPT | Performed by: INTERNAL MEDICINE

## 2024-09-24 PROCEDURE — 1123F ACP DISCUSS/DSCN MKR DOCD: CPT | Performed by: INTERNAL MEDICINE

## 2024-09-24 PROCEDURE — 3079F DIAST BP 80-89 MM HG: CPT | Performed by: INTERNAL MEDICINE

## 2024-09-24 PROCEDURE — 90662 IIV NO PRSV INCREASED AG IM: CPT | Performed by: INTERNAL MEDICINE

## 2024-09-24 PROCEDURE — 1159F MED LIST DOCD IN RCRD: CPT | Performed by: INTERNAL MEDICINE

## 2024-09-24 PROCEDURE — 3077F SYST BP >= 140 MM HG: CPT | Performed by: INTERNAL MEDICINE

## 2024-09-24 PROCEDURE — 1160F RVW MEDS BY RX/DR IN RCRD: CPT | Performed by: INTERNAL MEDICINE

## 2024-09-24 PROCEDURE — G0008 ADMIN INFLUENZA VIRUS VAC: HCPCS | Performed by: INTERNAL MEDICINE

## 2024-09-24 RX ORDER — ERGOCALCIFEROL 1.25 MG/1
CAPSULE ORAL
Qty: 13 CAPSULE | Refills: 0 | Status: SHIPPED | OUTPATIENT
Start: 2024-09-24

## 2024-09-24 ASSESSMENT — ENCOUNTER SYMPTOMS
OCCASIONAL FEELINGS OF UNSTEADINESS: 0
LOSS OF SENSATION IN FEET: 0
DEPRESSION: 0

## 2024-09-24 ASSESSMENT — PATIENT HEALTH QUESTIONNAIRE - PHQ9
1. LITTLE INTEREST OR PLEASURE IN DOING THINGS: NOT AT ALL
SUM OF ALL RESPONSES TO PHQ9 QUESTIONS 1 AND 2: 1
10. IF YOU CHECKED OFF ANY PROBLEMS, HOW DIFFICULT HAVE THESE PROBLEMS MADE IT FOR YOU TO DO YOUR WORK, TAKE CARE OF THINGS AT HOME, OR GET ALONG WITH OTHER PEOPLE: NOT DIFFICULT AT ALL
2. FEELING DOWN, DEPRESSED OR HOPELESS: SEVERAL DAYS

## 2024-09-24 ASSESSMENT — ACTIVITIES OF DAILY LIVING (ADL)
TAKING_MEDICATION: INDEPENDENT
DRESSING: INDEPENDENT
GROCERY_SHOPPING: INDEPENDENT
MANAGING_FINANCES: INDEPENDENT
DOING_HOUSEWORK: INDEPENDENT
BATHING: INDEPENDENT

## 2024-09-24 NOTE — PATIENT INSTRUCTIONS
Thank you very much for coming.  I am glad to see you.  Thank you for having Brii join us!    We did your Medicare Wellness evaluation today, and you seem to be doing well.    You do have a tendency for URGENCY to URINATE.  Please make sure to go to the bathroom before you need to go to the bathroom, so that you do not have to hurry, and you do not get into accidents!    The pins-and-needles and discomfort that you feel along your hands and feet, these may be related to your DIABETES.  Please continue to follow with your ENDOCRINOLOGIST, Dr. Jameson.  When you return to see me again in 2 months, I will review what he recommends, and I will make some further recommendations as needed.    Please continue to eat sensibly.  DIABETIC diet please.  [place] diet book, DASH diet, Mediterranean diet, all very helpful.    Also, please stay physically active.  Lots of physical activity, especially brisk walking.  Very good for blood pressure control, weight management, blood sugar management, mood and energy!    I do understand that you have been having some HEADACHES.  You are due to be evaluated for your VISION.  Very important.  Afterwards, if you still have headaches, then we will reevaluate, perhaps even do a CT scan of your head.    Please see Dr. Jameson.  Your appointment with him will be OCTOBER 31.  Call his office to find out what time.    I will see you again in 2 months.  I will figure out what Dr. Jameson recommended, and what other further tests might help you improve your overall quality of life!    We reviewed your LIVING WILL wishes and your CODE STATUS.  We will keep your DAUGHTER, Brii, as your DURABLE POWER OF  for healthcare matters.  This means that if you are unable to make decisions for yourself, she will be the person who will help make decisions for you and matters regarding your health.    Your CODE STATUS is FULL CODE, meaning that if you are on the verge of dying, we will do everything possible  to save your life, including extraordinary means of treatment.  Once you are stable, we will discuss with you and your family how you are doing, and what your options are.    At the very least, we will try our best to maintain your quality of life and keep you comfortable.    Again, thank you very much for coming.  Please come back in 2 months, so that I can see how you are doing, make some further recommendations to prepare you for winter.  I will coordinate with the recommendations of Dr. Jameson, and you might even need some repeat lab work.    Until then, please continue to take care of yourself and each other, and please continue to pray for our recovery from this pandemic.    Today, you will benefit from INFLUENZA vaccination.  This may make you a little tired and achy.  Drink lots of fluids throughout the day.  Get lots of rest and sleep.  You should be okay after a day or so!    Take care and God bless.  See you in 2 months.            0  Return in 2 months.  20 minutes please.  Reassess mood, energy, function, compliance, tolerance, preventive strategies, cardiovascular risk.  Coordinate with endocrinology.  Consider repeat fasting laboratory examinations.  Prioritize issues.  Rule out self-neglect, caregiver stress.            0

## 2024-09-24 NOTE — PROGRESS NOTES
Subjective   Reason for Visit: Markos Interiano is an 68 y.o. male here for a Medicare Wellness visit.     Past Medical, Surgical, and Family History reviewed and updated in chart.    Reviewed all medications by prescribing practitioner or clinical pharmacist (such as prescriptions, OTCs, herbal therapies and supplements) and documented in the medical record.    HPI  The patient is here for his yearly Medicare Wellness evaluation.  He has no particular complaints, although his daughter points out that he was complaining of headache, seemingly related to blurred vision.  Otherwise, no focal findings.  No diplopia.  No dysphagia.  No focal weakness.  No particular ataxia, no recent falls.    The patient is also apparently requesting for a letter to be written so that he can live alone.  He apparently is surrounded by family, and he apparently is being irritated by the noise.  Otherwise, continues to want to improve quality of life, and does not wish harm to self or others.    Also, states some ANESTHESIA affecting hands and feet.  Does follow with ENDOCRINOLOGY, and has lately been more compliant with medications.  Also states that he follows a diabetic diet.  States that he has been staying physically active.    States some URGENCY, but otherwise, no dysuria, flank, suprapubic pain.  Appetite preserved, no abdominal distress.    Otherwise, no landon substernal chest pain, no orthopnea, no paroxysmal nocturnal dyspnea.  No particular cough, no particular sputum production.  CONSTITUTIONALLY, no fever, no chills.  No night sweats.  No lingering anorexia or nausea.  No apparent lymphadenopathy.  No apparent weight loss.      Medicare Wellness evaluation, CODE STATUS, living will wishes, all reviewed today as well.  Please see.    Patient Care Team:  Bill Mendieta MD as PCP - General  Bill Mendieta MD as PCP - [employer] PCP         Review of Systems  Review of systems as in history  "of present illness, and otherwise, reviewed separately as well, and was unremarkable/negative/noncontributory.        Objective   Vitals:  /80 (BP Location: Left arm, Patient Position: Sitting, BP Cuff Size: Adult)   Pulse 70   Ht 1.803 m (5' 11\")   Wt 101 kg (222 lb)   SpO2 95%   BMI 30.96 kg/m²       Physical Exam  In otherwise good spirits.  Not in distress or diaphoresis.  Receptive, oriented x 3.  Amiable.  Not unkempt.  Moderately built.  Seemingly appropriate, and wishing to improve quality of life.  Not wishing harm to self or others.  Daughter/POA in attendance, making sure that he understands what is going on, and makes sure that I understand his answers.    HEAD pink palpebral conjunctivae, anicteric sclerae.  NECK supple, no apparent jugular venous distention.  CARDIOVASCULAR not in distress or diaphoresis.  No bipedal edema.  LUNGS not in distress or diaphoresis.  Not using accessory muscles.  ABDOMEN soft, nontender.  BACK no costovertebral angle tenderness.  EXTREMITIES no clubbing, no cyanosis.  NEURO no facial asymmetry.  No apparent cranial nerve deficits.  Romberg negative.  Ambulating without need of assistance.  No apparent focal weakness.  No tremors.  PSYCH receptive, appropriate, and eager to maintain and improve quality of life.        LABORATORY results reviewed, with July laboratory examination showing some stable renal insufficiency, preserved liver function.  Cholesterol panel noted, with ASCVD risk 14.4.  Hemoglobin A1c 6.9, body mass index 30.96.    .  Uric acid mildly elevated, stage III kidney function noted.  Microalbumin noted with negative urinalysis.  B12 repleted.  Vitamin D 30.  Mild anemia noted.        Markos was seen today for follow-up.  Diagnoses and all orders for this visit:  Routine general medical examination at health care facility (Primary)  -     Follow Up In Primary Care - Established; Future  -     Flu vaccine, trivalent, preservative free, " HIGH-DOSE, age 65y+ (Fluzone)  Essential hypertension  -     Follow Up In Primary Care [facility]; Future  -     Flu vaccine, trivalent, preservative free, HIGH-DOSE, age 65y+ (Fluzone)  Mixed hyperlipidemia  -     Follow Up In Primary Care [facility]; Future  -     Flu vaccine, trivalent, preservative free, HIGH-DOSE, age 65y+ (Fluzone)  Type 2 diabetes mellitus with stage 3a chronic kidney disease, with long-term current use of insulin (Multi)  -     Follow Up In Primary Care - Established; Future  -     Flu vaccine, trivalent, preservative free, HIGH-DOSE, age 65y+ (Fluzone)  [place] cardiac risk >20% in next 10 years  -     Follow Up In Primary Care - Established; Future  -     Flu vaccine, trivalent, preservative free, HIGH-DOSE, age 65y+ (Fluzone)  Interstitial myositis of multiple sites  -     Follow Up In Primary Care - Established; Future  -     Flu vaccine, trivalent, preservative free, HIGH-DOSE, age 65y+ (Fluzone)  Anemia due to vitamin B12 deficiency, unspecified B12 deficiency type  -     Follow Up In Primary Care - Established; Future  -     Flu vaccine, trivalent, preservative free, HIGH-DOSE, age 65y+ (Fluzone)  Microalbuminuria  -     Follow Up In Primary Care - Established; Future  -     Flu vaccine, trivalent, preservative free, HIGH-DOSE, age 65y+ (Fluzone)  Hyperuricemia  -     Follow Up In Primary Care - Established; Future  -     Flu vaccine, trivalent, preservative free, HIGH-DOSE, age 65y+ (Fluzone)  Vitamin D deficiency  -     ergocalciferol (Vitamin D-2) 1.25 MG (86971 UT) capsule; TAKE 1 CAPSULE BY MOUTH ONCE WEEKLY ON SUNDAY WITH LUNCH AND A FULL GLASS OF WATER  -     Follow Up In Primary Care - Established; Future  -     Flu vaccine, trivalent, preservative free, HIGH-DOSE, age 65y+ (Fluzone)  Class 1 obesity due to excess calories with serious comorbidity and body mass index (BMI) of 30.0 to 30.9 in adult  -     Follow Up In Primary Care - Established; Future  -     Flu  vaccine, trivalent, preservative free, HIGH-DOSE, age 65y+ (Fluzone)  Urgency of urination  -     Follow Up In Primary Care - Established; Future  -     Flu vaccine, trivalent, preservative free, HIGH-DOSE, age 65y+ (Fluzone)        Thank you very much for coming.  I am glad to see you.  Thank you for having Brii join us!    We did your Medicare Wellness evaluation today, and you seem to be doing well.    You do have a tendency for URGENCY to URINATE.  Please make sure to go to the bathroom before you need to go to the bathroom, so that you do not have to hurry, and you do not get into accidents!    The pins-and-needles and discomfort that you feel along your hands and feet, these may be related to your DIABETES.  Please continue to follow with your ENDOCRINOLOGIST, Dr. Jameson.  When you return to see me again in 2 months, I will review what he recommends, and I will make some further recommendations as needed.    Please continue to eat sensibly.  DIABETIC diet please.  [employer] diet book, DASH diet, Mediterranean diet, all very helpful.    Also, please stay physically active.  Lots of physical activity, especially brisk walking.  Very good for blood pressure control, weight management, blood sugar management, mood and energy!    I do understand that you have been having some HEADACHES.  You are due to be evaluated for your VISION.  Very important.  Afterwards, if you still have headaches, then we will reevaluate, perhaps even do a CT scan of your head.    Please see Dr. Jameson.  Your appointment with him will be OCTOBER 31.  Call his office to find out what time.    I will see you again in 2 months.  I will figure out what Dr. Jameson recommended, and what other further tests might help you improve your overall quality of life!    We reviewed your LIVING WILL wishes and your CODE STATUS.  We will keep your DAUGHTER, Brii, as your DURABLE POWER OF  for healthcare matters.  This means that if you are unable to  make decisions for yourself, she will be the person who will help make decisions for you and matters regarding your health.    Your CODE STATUS is FULL CODE, meaning that if you are on the verge of dying, we will do everything possible to save your life, including extraordinary means of treatment.  Once you are stable, we will discuss with you and your family how you are doing, and what your options are.    At the very least, we will try our best to maintain your quality of life and keep you comfortable.    Again, thank you very much for coming.  Please come back in 2 months, so that I can see how you are doing, make some further recommendations to prepare you for winter.  I will coordinate with the recommendations of Dr. Jameson, and you might even need some repeat lab work.    Until then, please continue to take care of yourself and each other, and please continue to pray for our recovery from this pandemic.    Today, you will benefit from INFLUENZA vaccination.  This may make you a little tired and achy.  Drink lots of fluids throughout the day.  Get lots of rest and sleep.  You should be okay after a day or so!    Take care and God bless.  See you in 2 months.            0  Return in 2 months.  20 minutes please.  Reassess mood, energy, function, compliance, tolerance, preventive strategies, cardiovascular risk.  Coordinate with endocrinology.  Consider repeat fasting laboratory examinations.  Prioritize issues.  Rule out self-neglect, caregiver stress.            0  Patient requesting for letter, I am assuming, for his landlord.  He is requesting to be allowed to live alone.  It seems that he is becoming more intolerant of noise.  Daughter also pointing out that he talks to himself sometimes.  Currently, he does not look inappropriate at this time.  I will continue to look into his complaints if this becomes a persistent issue.  Otherwise, oriented x 3, appropriate.            0

## 2024-12-02 ENCOUNTER — APPOINTMENT (OUTPATIENT)
Dept: PRIMARY CARE | Facility: CLINIC | Age: 69
End: 2024-12-02
Payer: COMMERCIAL

## 2024-12-02 VITALS
SYSTOLIC BLOOD PRESSURE: 149 MMHG | HEART RATE: 82 BPM | BODY MASS INDEX: 31.78 KG/M2 | OXYGEN SATURATION: 94 % | HEIGHT: 71 IN | WEIGHT: 227 LBS | DIASTOLIC BLOOD PRESSURE: 84 MMHG

## 2024-12-02 DIAGNOSIS — I10 ESSENTIAL HYPERTENSION: ICD-10-CM

## 2024-12-02 DIAGNOSIS — E66.09 CLASS 1 OBESITY DUE TO EXCESS CALORIES WITH SERIOUS COMORBIDITY AND BODY MASS INDEX (BMI) OF 30.0 TO 30.9 IN ADULT: ICD-10-CM

## 2024-12-02 DIAGNOSIS — E11.22 TYPE 2 DIABETES MELLITUS WITH STAGE 3A CHRONIC KIDNEY DISEASE, WITH LONG-TERM CURRENT USE OF INSULIN (MULTI): ICD-10-CM

## 2024-12-02 DIAGNOSIS — E66.811 CLASS 1 OBESITY DUE TO EXCESS CALORIES WITH SERIOUS COMORBIDITY AND BODY MASS INDEX (BMI) OF 30.0 TO 30.9 IN ADULT: ICD-10-CM

## 2024-12-02 DIAGNOSIS — D51.9 ANEMIA DUE TO VITAMIN B12 DEFICIENCY, UNSPECIFIED B12 DEFICIENCY TYPE: ICD-10-CM

## 2024-12-02 DIAGNOSIS — Z91.89 FRAMINGHAM CARDIAC RISK >20% IN NEXT 10 YEARS: ICD-10-CM

## 2024-12-02 DIAGNOSIS — R80.9 MICROALBUMINURIA: ICD-10-CM

## 2024-12-02 DIAGNOSIS — E78.2 MIXED HYPERLIPIDEMIA: ICD-10-CM

## 2024-12-02 DIAGNOSIS — Z79.4 TYPE 2 DIABETES MELLITUS WITH STAGE 3A CHRONIC KIDNEY DISEASE, WITH LONG-TERM CURRENT USE OF INSULIN (MULTI): ICD-10-CM

## 2024-12-02 DIAGNOSIS — R39.15 URGENCY OF URINATION: ICD-10-CM

## 2024-12-02 DIAGNOSIS — M60.19 INTERSTITIAL MYOSITIS OF MULTIPLE SITES: ICD-10-CM

## 2024-12-02 DIAGNOSIS — E55.9 VITAMIN D DEFICIENCY: ICD-10-CM

## 2024-12-02 DIAGNOSIS — E79.0 HYPERURICEMIA: ICD-10-CM

## 2024-12-02 DIAGNOSIS — N18.31 TYPE 2 DIABETES MELLITUS WITH STAGE 3A CHRONIC KIDNEY DISEASE, WITH LONG-TERM CURRENT USE OF INSULIN (MULTI): ICD-10-CM

## 2024-12-02 PROCEDURE — 3008F BODY MASS INDEX DOCD: CPT | Performed by: INTERNAL MEDICINE

## 2024-12-02 PROCEDURE — 1036F TOBACCO NON-USER: CPT | Performed by: INTERNAL MEDICINE

## 2024-12-02 PROCEDURE — 1158F ADVNC CARE PLAN TLK DOCD: CPT | Performed by: INTERNAL MEDICINE

## 2024-12-02 PROCEDURE — 1123F ACP DISCUSS/DSCN MKR DOCD: CPT | Performed by: INTERNAL MEDICINE

## 2024-12-02 PROCEDURE — 3079F DIAST BP 80-89 MM HG: CPT | Performed by: INTERNAL MEDICINE

## 2024-12-02 PROCEDURE — 1160F RVW MEDS BY RX/DR IN RCRD: CPT | Performed by: INTERNAL MEDICINE

## 2024-12-02 PROCEDURE — 99213 OFFICE O/P EST LOW 20 MIN: CPT | Performed by: INTERNAL MEDICINE

## 2024-12-02 PROCEDURE — 3062F POS MACROALBUMINURIA REV: CPT | Performed by: INTERNAL MEDICINE

## 2024-12-02 PROCEDURE — 4010F ACE/ARB THERAPY RXD/TAKEN: CPT | Performed by: INTERNAL MEDICINE

## 2024-12-02 PROCEDURE — 3048F LDL-C <100 MG/DL: CPT | Performed by: INTERNAL MEDICINE

## 2024-12-02 PROCEDURE — 1159F MED LIST DOCD IN RCRD: CPT | Performed by: INTERNAL MEDICINE

## 2024-12-02 PROCEDURE — G2211 COMPLEX E/M VISIT ADD ON: HCPCS | Performed by: INTERNAL MEDICINE

## 2024-12-02 PROCEDURE — 3044F HG A1C LEVEL LT 7.0%: CPT | Performed by: INTERNAL MEDICINE

## 2024-12-02 PROCEDURE — 3077F SYST BP >= 140 MM HG: CPT | Performed by: INTERNAL MEDICINE

## 2024-12-02 RX ORDER — METOPROLOL SUCCINATE 100 MG/1
TABLET, EXTENDED RELEASE ORAL
Qty: 180 TABLET | Refills: 0 | Status: SHIPPED | OUTPATIENT
Start: 2024-12-02

## 2024-12-02 NOTE — PROGRESS NOTES
"Subjective   Patient ID: Markos Interiano is a 68 y.o. male who presents for 2 Month FU (Pt in today for routine 2 month FU).    HPI   Here with daughter.  Seemingly compliant with medications, tolerating regimens, but does not recall his medications off the top of his head.  Has not seen NEPHROLOGY in a while.  No landon substernal chest pain, no orthopnea, no paroxysmal nocturnal dyspnea.  No change in urine character or habits.  No lingering anorexia, nausea, no history of lethargy.  No history of jaundice or easy bruisability.  No symptoms that might be referable to uremia.    Does want to improve quality of life, and does not wish harm to self or others.  Following closely with endocrinology, ophthalmology, podiatry.  Was told that his DIABETIC RETINOPATHY is worse on his left eye, and he has been told to return and keep his appointment in 1 month.  Currently, no headache, no blurred vision, no diplopia.        Review of Systems  Review of systems as in history of present illness, and otherwise, reviewed separately as well, and was unremarkable/negative/noncontributory.        Objective   /84 (BP Location: Right arm, Patient Position: Sitting, BP Cuff Size: Large adult)   Pulse 82   Ht 1.803 m (5' 11\")   Wt 103 kg (227 lb)   SpO2 94%   BMI 31.66 kg/m²     Physical Exam  In otherwise good spirits.  Not in distress or diaphoresis.  Alert, oriented x 3.  Amiable.  Not unkempt.  Moderately obese build, but remaining independent and capable, and eager to improve quality of life.  Does not wish harm to self or others.  Appropriate.    HEAD pink palpebral conjunctivae, anicteric sclerae.  NECK supple, no apparent jugular venous distention.  CARDIOVASCULAR not in distress or diaphoresis.  No bipedal edema.  LUNGS not in distress or diaphoresis.  Not using accessory muscles.  ABDOMEN soft, nontender.  BACK no costovertebral angle tenderness.  EXTREMITIES no clubbing, no cyanosis.  NEURO no facial " asymmetry.  No apparent cranial nerve deficits.  Romberg negative.  Ambulating without need of assistance.  No apparent focal weakness.  No tremors.  PSYCH receptive, appropriate, and eager to maintain and improve quality of life.        LABORATORY results noted, with last workup in JULY, was seen by ENDOCRINOLOGY in SEPTEMBER, with laboratory results not available, not recalled by patient or daughter.        Assessment/Plan   Diagnoses and all orders for this visit:  Essential hypertension  -     Follow Up In Primary Care - Established  -     CBC and Auto Differential; Future  -     Comprehensive Metabolic Panel; Future  -     TSH with reflex to Free T4 if abnormal; Future  -     Magnesium; Future  -     Creatine Kinase; Future  -     Urinalysis with Reflex Culture and Microscopic; Future  -     Follow Up In Primary Care - Established; Future  -     metoprolol succinate XL (Toprol-XL) 100 mg 24 hr tablet; Please take 1 tablet by mouth with breakfast, and again 1 tablet by mouth with supper.  Thank you.  Mixed hyperlipidemia  -     Comprehensive Metabolic Panel; Future  -     Lipid Panel; Future  -     Follow Up In Primary Care - Established; Future  -     metoprolol succinate XL (Toprol-XL) 100 mg 24 hr tablet; Please take 1 tablet by mouth with breakfast, and again 1 tablet by mouth with supper.  Thank you.  Type 2 diabetes mellitus with stage 3a chronic kidney disease, with long-term current use of insulin (Multi)  -     Comprehensive Metabolic Panel; Future  -     Urinalysis with Reflex Culture and Microscopic; Future  -     Hemoglobin A1C; Future  -     Follow Up In Primary Care - Established; Future  -     metoprolol succinate XL (Toprol-XL) 100 mg 24 hr tablet; Please take 1 tablet by mouth with breakfast, and again 1 tablet by mouth with supper.  Thank you.  [place] cardiac risk >20% in next 10 years  -     Comprehensive Metabolic Panel; Future  -     Lipid Panel; Future  -     Follow Up In Primary Care -  Established; Future  -     metoprolol succinate XL (Toprol-XL) 100 mg 24 hr tablet; Please take 1 tablet by mouth with breakfast, and again 1 tablet by mouth with supper.  Thank you.  Interstitial myositis of multiple sites  -     Comprehensive Metabolic Panel; Future  -     Creatine Kinase; Future  -     Follow Up In Primary Care - Established; Future  -     metoprolol succinate XL (Toprol-XL) 100 mg 24 hr tablet; Please take 1 tablet by mouth with breakfast, and again 1 tablet by mouth with supper.  Thank you.  Anemia due to vitamin B12 deficiency, unspecified B12 deficiency type  -     CBC and Auto Differential; Future  -     Vitamin B12; Future  -     Ferritin; Future  -     Urinalysis with Reflex Culture and Microscopic; Future  -     Follow Up In Primary Care - Established; Future  -     metoprolol succinate XL (Toprol-XL) 100 mg 24 hr tablet; Please take 1 tablet by mouth with breakfast, and again 1 tablet by mouth with supper.  Thank you.  Microalbuminuria  -     Comprehensive Metabolic Panel; Future  -     Urinalysis with Reflex Culture and Microscopic; Future  -     Follow Up In Primary Care - Established; Future  -     metoprolol succinate XL (Toprol-XL) 100 mg 24 hr tablet; Please take 1 tablet by mouth with breakfast, and again 1 tablet by mouth with supper.  Thank you.  Hyperuricemia  -     Comprehensive Metabolic Panel; Future  -     Uric Acid; Future  -     Follow Up In Primary Care - Established; Future  -     metoprolol succinate XL (Toprol-XL) 100 mg 24 hr tablet; Please take 1 tablet by mouth with breakfast, and again 1 tablet by mouth with supper.  Thank you.  Vitamin D deficiency  -     Comprehensive Metabolic Panel; Future  -     Vitamin D 25-Hydroxy,Total (for eval of Vitamin D levels); Future  -     Follow Up In Primary Care - Established; Future  -     metoprolol succinate XL (Toprol-XL) 100 mg 24 hr tablet; Please take 1 tablet by mouth with breakfast, and again 1 tablet by mouth with  supper.  Thank you.  Class 1 obesity due to excess calories with serious comorbidity and body mass index (BMI) of 30.0 to 30.9 in adult  -     Comprehensive Metabolic Panel; Future  -     TSH with reflex to Free T4 if abnormal; Future  -     Follow Up In Primary Care - Established; Future  -     metoprolol succinate XL (Toprol-XL) 100 mg 24 hr tablet; Please take 1 tablet by mouth with breakfast, and again 1 tablet by mouth with supper.  Thank you.  Urgency of urination  -     CBC and Auto Differential; Future  -     Urinalysis with Reflex Culture and Microscopic; Future  -     Follow Up In Primary Care - Established; Future  -     metoprolol succinate XL (Toprol-XL) 100 mg 24 hr tablet; Please take 1 tablet by mouth with breakfast, and again 1 tablet by mouth with supper.  Thank you.       Thank you very much for coming.  I am very happy to see you again.    Your blood pressure was high when you first came, and is still high after resting.    Please continue your current medications:    AMLODIPINE 10 mg by mouth every day.  CHLORTHALIDONE 50 mg by mouth with ROUGHEST every morning.  Please continue taking your POTASSIUM supplementation as well.  HYDRALAZINE 50 mg by mouth 3 times a day.  ISOSORBIDE mononitrate 60 mg by mouth every day.    Please INCREASE your METOPROLOL SUCCINATE 100 mg and take this with BREAKFAST, and again with SUPPER, twice a day please.  Again, very important, please take your METOPROLOL SUCCINATE 100 mg by mouth twice a day.    Please call on Dr. Patel, NEPHROLOGY, and get an appointment with him soon.  The last time you saw him was probably 1 year ago.  He will be very helpful in keeping your kidneys healthy, and keeping your blood pressure controlled.  [phone].  Please call them today and make an appointment soon.    Please have FASTING blood and urine examinations done in 1 week.    Please come back in 6 weeks, so that we can see how you are doing.  Until then, please continue to  take care of yourself and each other, and please continue to pray for our recovery from this pandemic.    I hope you have a good [place], and a happy new year!  Manav Ortiz!  Take care and God bless.    Please check your blood pressure in the evening, in a calm and resting state.  Seated position, feet flat on the floor, use the backrest of the chair.  Please record your blood pressure readings, so that we can discuss them when I call you back.            0  Return in 6 weeks.  20 minutes please.  Reassess compliance, tolerance, hopefully coordinate with nephrology, ophthalmology, and in the future, endocrinology, podiatry.  Watch out for self-neglect, caregiver stress of daughter.  Review preventive strategies, vaccination profile.            0

## 2024-12-02 NOTE — PATIENT INSTRUCTIONS
Thank you very much for coming.  I am very happy to see you again.    Your blood pressure was high when you first came, and is still high after resting.    Please continue your current medications:    AMLODIPINE 10 mg by mouth every day.  CHLORTHALIDONE 50 mg by mouth with ROUGHEST every morning.  Please continue taking your POTASSIUM supplementation as well.  HYDRALAZINE 50 mg by mouth 3 times a day.  ISOSORBIDE mononitrate 60 mg by mouth every day.    Please INCREASE your METOPROLOL SUCCINATE 100 mg and take this with BREAKFAST, and again with SUPPER, twice a day please.  Again, very important, please take your METOPROLOL SUCCINATE 100 mg by mouth twice a day.    Please call on Dr. Patel, NEPHROLOGY, and get an appointment with him soon.  The last time you saw him was probably 1 year ago.  He will be very helpful in keeping your kidneys healthy, and keeping your blood pressure controlled.  [phone].  Please call them today and make an appointment soon.    Please have FASTING blood and urine examinations done in 1 week.    Please come back in 6 weeks, so that we can see how you are doing.  Until then, please continue to take care of yourself and each other, and please continue to pray for our recovery from this pandemic.    I hope you have a good [place], and a happy new year!  Manav Ortiz!  Take care and God bless.    Please check your blood pressure in the evening, in a calm and resting state.  Seated position, feet flat on the floor, use the backrest of the chair.  Please record your blood pressure readings, so that we can discuss them when I call you back.            0  Return in 6 weeks.  20 minutes please.  Reassess compliance, tolerance, hopefully coordinate with nephrology, ophthalmology, and in the future, endocrinology, podiatry.  Watch out for self-neglect, caregiver stress of daughter.  Review preventive strategies, vaccination profile.            0

## 2024-12-13 ENCOUNTER — LAB (OUTPATIENT)
Dept: LAB | Facility: LAB | Age: 69
End: 2024-12-13
Payer: COMMERCIAL

## 2024-12-13 DIAGNOSIS — E78.2 MIXED HYPERLIPIDEMIA: ICD-10-CM

## 2024-12-13 DIAGNOSIS — E66.811 CLASS 1 OBESITY DUE TO EXCESS CALORIES WITH SERIOUS COMORBIDITY AND BODY MASS INDEX (BMI) OF 30.0 TO 30.9 IN ADULT: ICD-10-CM

## 2024-12-13 DIAGNOSIS — I10 ESSENTIAL HYPERTENSION: ICD-10-CM

## 2024-12-13 DIAGNOSIS — R39.15 URGENCY OF URINATION: ICD-10-CM

## 2024-12-13 DIAGNOSIS — E66.09 CLASS 1 OBESITY DUE TO EXCESS CALORIES WITH SERIOUS COMORBIDITY AND BODY MASS INDEX (BMI) OF 30.0 TO 30.9 IN ADULT: ICD-10-CM

## 2024-12-13 DIAGNOSIS — E79.0 HYPERURICEMIA: ICD-10-CM

## 2024-12-13 DIAGNOSIS — N18.31 TYPE 2 DIABETES MELLITUS WITH STAGE 3A CHRONIC KIDNEY DISEASE, WITH LONG-TERM CURRENT USE OF INSULIN (MULTI): ICD-10-CM

## 2024-12-13 DIAGNOSIS — M60.19 INTERSTITIAL MYOSITIS OF MULTIPLE SITES: ICD-10-CM

## 2024-12-13 DIAGNOSIS — Z79.4 TYPE 2 DIABETES MELLITUS WITH STAGE 3A CHRONIC KIDNEY DISEASE, WITH LONG-TERM CURRENT USE OF INSULIN (MULTI): ICD-10-CM

## 2024-12-13 DIAGNOSIS — D51.9 ANEMIA DUE TO VITAMIN B12 DEFICIENCY, UNSPECIFIED B12 DEFICIENCY TYPE: ICD-10-CM

## 2024-12-13 DIAGNOSIS — Z91.89 FRAMINGHAM CARDIAC RISK >20% IN NEXT 10 YEARS: ICD-10-CM

## 2024-12-13 DIAGNOSIS — E55.9 VITAMIN D DEFICIENCY: ICD-10-CM

## 2024-12-13 DIAGNOSIS — E11.22 TYPE 2 DIABETES MELLITUS WITH STAGE 3A CHRONIC KIDNEY DISEASE, WITH LONG-TERM CURRENT USE OF INSULIN (MULTI): ICD-10-CM

## 2024-12-13 DIAGNOSIS — R80.9 MICROALBUMINURIA: ICD-10-CM

## 2024-12-13 LAB
25(OH)D3 SERPL-MCNC: 41 NG/ML (ref 30–100)
ALBUMIN SERPL BCP-MCNC: 4.2 G/DL (ref 3.4–5)
ALP SERPL-CCNC: 71 U/L (ref 33–136)
ALT SERPL W P-5'-P-CCNC: 34 U/L (ref 10–52)
ANION GAP SERPL CALC-SCNC: 14 MMOL/L (ref 10–20)
APPEARANCE UR: CLEAR
AST SERPL W P-5'-P-CCNC: 30 U/L (ref 9–39)
BASOPHILS # BLD AUTO: 0.06 X10*3/UL (ref 0–0.1)
BASOPHILS NFR BLD AUTO: 1 %
BILIRUB SERPL-MCNC: 0.6 MG/DL (ref 0–1.2)
BILIRUB UR STRIP.AUTO-MCNC: NEGATIVE MG/DL
BUN SERPL-MCNC: 30 MG/DL (ref 6–23)
CALCIUM SERPL-MCNC: 9.4 MG/DL (ref 8.6–10.6)
CHLORIDE SERPL-SCNC: 100 MMOL/L (ref 98–107)
CHOLEST SERPL-MCNC: 188 MG/DL (ref 0–199)
CHOLESTEROL/HDL RATIO: 4.2
CK SERPL-CCNC: 778 U/L (ref 0–325)
CO2 SERPL-SCNC: 28 MMOL/L (ref 21–32)
COLOR UR: ABNORMAL
CREAT SERPL-MCNC: 1.4 MG/DL (ref 0.5–1.3)
EGFRCR SERPLBLD CKD-EPI 2021: 54 ML/MIN/1.73M*2
EOSINOPHIL # BLD AUTO: 0.18 X10*3/UL (ref 0–0.7)
EOSINOPHIL NFR BLD AUTO: 3 %
ERYTHROCYTE [DISTWIDTH] IN BLOOD BY AUTOMATED COUNT: 13.3 % (ref 11.5–14.5)
EST. AVERAGE GLUCOSE BLD GHB EST-MCNC: 137 MG/DL
FERRITIN SERPL-MCNC: 72 NG/ML (ref 20–300)
GLUCOSE SERPL-MCNC: 140 MG/DL (ref 74–99)
GLUCOSE UR STRIP.AUTO-MCNC: NORMAL MG/DL
HBA1C MFR BLD: 6.4 %
HCT VFR BLD AUTO: 39.4 % (ref 41–52)
HDLC SERPL-MCNC: 44.4 MG/DL
HGB BLD-MCNC: 13 G/DL (ref 13.5–17.5)
HOLD SPECIMEN: NORMAL
IMM GRANULOCYTES # BLD AUTO: 0.03 X10*3/UL (ref 0–0.7)
IMM GRANULOCYTES NFR BLD AUTO: 0.5 % (ref 0–0.9)
KETONES UR STRIP.AUTO-MCNC: NEGATIVE MG/DL
LDLC SERPL CALC-MCNC: 114 MG/DL
LEUKOCYTE ESTERASE UR QL STRIP.AUTO: NEGATIVE
LYMPHOCYTES # BLD AUTO: 2.33 X10*3/UL (ref 1.2–4.8)
LYMPHOCYTES NFR BLD AUTO: 38.4 %
MAGNESIUM SERPL-MCNC: 1.9 MG/DL (ref 1.6–2.4)
MCH RBC QN AUTO: 29.7 PG (ref 26–34)
MCHC RBC AUTO-ENTMCNC: 33 G/DL (ref 32–36)
MCV RBC AUTO: 90 FL (ref 80–100)
MONOCYTES # BLD AUTO: 0.52 X10*3/UL (ref 0.1–1)
MONOCYTES NFR BLD AUTO: 8.6 %
MUCOUS THREADS #/AREA URNS AUTO: NORMAL /LPF
NEUTROPHILS # BLD AUTO: 2.94 X10*3/UL (ref 1.2–7.7)
NEUTROPHILS NFR BLD AUTO: 48.5 %
NITRITE UR QL STRIP.AUTO: NEGATIVE
NON HDL CHOLESTEROL: 144 MG/DL (ref 0–149)
NRBC BLD-RTO: 0 /100 WBCS (ref 0–0)
PH UR STRIP.AUTO: 6 [PH]
PLATELET # BLD AUTO: 241 X10*3/UL (ref 150–450)
POTASSIUM SERPL-SCNC: 4.3 MMOL/L (ref 3.5–5.3)
PROT SERPL-MCNC: 7.5 G/DL (ref 6.4–8.2)
PROT UR STRIP.AUTO-MCNC: ABNORMAL MG/DL
RBC # BLD AUTO: 4.37 X10*6/UL (ref 4.5–5.9)
RBC # UR STRIP.AUTO: NEGATIVE /UL
RBC #/AREA URNS AUTO: NORMAL /HPF
SODIUM SERPL-SCNC: 138 MMOL/L (ref 136–145)
SP GR UR STRIP.AUTO: 1.01
TRIGL SERPL-MCNC: 148 MG/DL (ref 0–149)
TSH SERPL-ACNC: 1.78 MIU/L (ref 0.44–3.98)
URATE SERPL-MCNC: 7.2 MG/DL (ref 4–7.5)
UROBILINOGEN UR STRIP.AUTO-MCNC: NORMAL MG/DL
VIT B12 SERPL-MCNC: 233 PG/ML (ref 211–911)
VLDL: 30 MG/DL (ref 0–40)
WBC # BLD AUTO: 6.1 X10*3/UL (ref 4.4–11.3)
WBC #/AREA URNS AUTO: NORMAL /HPF

## 2024-12-13 PROCEDURE — 83735 ASSAY OF MAGNESIUM: CPT

## 2024-12-13 PROCEDURE — 82306 VITAMIN D 25 HYDROXY: CPT

## 2024-12-13 PROCEDURE — 84550 ASSAY OF BLOOD/URIC ACID: CPT

## 2024-12-13 PROCEDURE — 80061 LIPID PANEL: CPT

## 2024-12-13 PROCEDURE — 82550 ASSAY OF CK (CPK): CPT

## 2024-12-13 PROCEDURE — 84443 ASSAY THYROID STIM HORMONE: CPT

## 2024-12-13 PROCEDURE — 81001 URINALYSIS AUTO W/SCOPE: CPT

## 2024-12-13 PROCEDURE — 83036 HEMOGLOBIN GLYCOSYLATED A1C: CPT

## 2024-12-13 PROCEDURE — 82607 VITAMIN B-12: CPT

## 2024-12-13 PROCEDURE — 36415 COLL VENOUS BLD VENIPUNCTURE: CPT

## 2024-12-13 PROCEDURE — 85025 COMPLETE CBC W/AUTO DIFF WBC: CPT

## 2024-12-13 PROCEDURE — 82728 ASSAY OF FERRITIN: CPT

## 2024-12-13 PROCEDURE — 80053 COMPREHEN METABOLIC PANEL: CPT

## 2025-01-16 ENCOUNTER — APPOINTMENT (OUTPATIENT)
Dept: PRIMARY CARE | Facility: CLINIC | Age: 70
End: 2025-01-16
Payer: COMMERCIAL

## 2025-03-05 ENCOUNTER — APPOINTMENT (OUTPATIENT)
Dept: PRIMARY CARE | Facility: CLINIC | Age: 70
End: 2025-03-05
Payer: COMMERCIAL

## 2025-03-05 VITALS
WEIGHT: 225.4 LBS | OXYGEN SATURATION: 96 % | SYSTOLIC BLOOD PRESSURE: 128 MMHG | HEART RATE: 81 BPM | DIASTOLIC BLOOD PRESSURE: 63 MMHG | HEIGHT: 71 IN | BODY MASS INDEX: 31.56 KG/M2

## 2025-03-05 DIAGNOSIS — E53.8 VITAMIN B12 DEFICIENCY: ICD-10-CM

## 2025-03-05 DIAGNOSIS — N18.31 STAGE 3A CHRONIC KIDNEY DISEASE (MULTI): ICD-10-CM

## 2025-03-05 DIAGNOSIS — E55.9 VITAMIN D DEFICIENCY: ICD-10-CM

## 2025-03-05 DIAGNOSIS — Z91.89 FRAMINGHAM CARDIAC RISK >20% IN NEXT 10 YEARS: ICD-10-CM

## 2025-03-05 DIAGNOSIS — E11.22 TYPE 2 DIABETES MELLITUS WITH STAGE 3A CHRONIC KIDNEY DISEASE, WITH LONG-TERM CURRENT USE OF INSULIN (MULTI): Primary | ICD-10-CM

## 2025-03-05 DIAGNOSIS — E78.2 MIXED HYPERLIPIDEMIA: ICD-10-CM

## 2025-03-05 DIAGNOSIS — N18.31 TYPE 2 DIABETES MELLITUS WITH STAGE 3A CHRONIC KIDNEY DISEASE, WITH LONG-TERM CURRENT USE OF INSULIN (MULTI): Primary | ICD-10-CM

## 2025-03-05 DIAGNOSIS — Z79.4 TYPE 2 DIABETES MELLITUS WITH STAGE 3A CHRONIC KIDNEY DISEASE, WITH LONG-TERM CURRENT USE OF INSULIN (MULTI): Primary | ICD-10-CM

## 2025-03-05 DIAGNOSIS — D51.9 ANEMIA DUE TO VITAMIN B12 DEFICIENCY, UNSPECIFIED B12 DEFICIENCY TYPE: ICD-10-CM

## 2025-03-05 DIAGNOSIS — M60.19 INTERSTITIAL MYOSITIS OF MULTIPLE SITES: ICD-10-CM

## 2025-03-05 DIAGNOSIS — E79.0 HYPERURICEMIA: ICD-10-CM

## 2025-03-05 DIAGNOSIS — I10 ESSENTIAL HYPERTENSION: ICD-10-CM

## 2025-03-05 PROCEDURE — 4010F ACE/ARB THERAPY RXD/TAKEN: CPT | Performed by: INTERNAL MEDICINE

## 2025-03-05 PROCEDURE — 1123F ACP DISCUSS/DSCN MKR DOCD: CPT | Performed by: INTERNAL MEDICINE

## 2025-03-05 PROCEDURE — 1159F MED LIST DOCD IN RCRD: CPT | Performed by: INTERNAL MEDICINE

## 2025-03-05 PROCEDURE — 3008F BODY MASS INDEX DOCD: CPT | Performed by: INTERNAL MEDICINE

## 2025-03-05 PROCEDURE — 1160F RVW MEDS BY RX/DR IN RCRD: CPT | Performed by: INTERNAL MEDICINE

## 2025-03-05 PROCEDURE — 1036F TOBACCO NON-USER: CPT | Performed by: INTERNAL MEDICINE

## 2025-03-05 PROCEDURE — G2211 COMPLEX E/M VISIT ADD ON: HCPCS | Performed by: INTERNAL MEDICINE

## 2025-03-05 PROCEDURE — 3078F DIAST BP <80 MM HG: CPT | Performed by: INTERNAL MEDICINE

## 2025-03-05 PROCEDURE — 99213 OFFICE O/P EST LOW 20 MIN: CPT | Performed by: INTERNAL MEDICINE

## 2025-03-05 PROCEDURE — 3074F SYST BP LT 130 MM HG: CPT | Performed by: INTERNAL MEDICINE

## 2025-03-05 NOTE — PATIENT INSTRUCTIONS
Thank you very much for coming.  I am very happy to see you today.    I am sorry that you did not complete your EYE EXAMINATION.  Please see your current EYE SPECIALIST, and afterwards, you will not have to see her again, and I will get you a new eye doctor afterwards.  Just see her once more.    Please call me and let me know what medicines you are missing, and I will send in for your refills.  Remember to ask for your DIABETES supplies from Dr. Jameson, ENDOCRINOLOGY, as well.  Also, please remember to keep your appointment with the KIDNEY specialist, Dr. Patel, as he recommends.    Please come back in 3 months.  Do some FASTING blood and urine examinations a few days prior.  You can have these done at [facility].  This way, when I see you, I will be able to review the results with you, and they will also be ready for Dr. Jameson, your ENDOCRINOLOGIST, to review when you see him.  Please keep your appointment with him as well.    Please continue to drink lots of fluids throughout the day, and avoid excessive salt, and urinate often while awake.    Please continue to stay physically active.    Please continue to eat sensibly.  [employer] diet book, DASH diet, Mediterranean diet for ideas.    Please get yourself VACCINATED for SHINGLES.  You can get this from your local friendly pharmacy.  SHINGRIX comes in a series of 2 injections at least 1 month apart.  Please get this done soon.    Again, thank you very much for coming.  See you in 3 months.  Call or text sooner as needed.  Take care and God bless.  I hope you have a blessed [place] Wednesday.            0  Return in 3 months.  40 minutes please.  Repeat FASTING laboratory examinations, then see me for Medicare Wellness evaluation for the year.  Coordinate with nephrology, ophthalmology, hopefully podiatry.  Prepare patient for next appointment with endocrinology.  Reassess mood, energy, function, preventive strategies, cardiovascular risk, compliance,  tolerance.            0

## 2025-03-05 NOTE — PROGRESS NOTES
"Subjective   Patient ID: Markos Interiano is a 69 y.o. male who presents for Follow-up (Patient presented today for a 6 week follow up./).    HPI   Patient seemingly doing much better.  Following closely with ENDOCRINOLOGY.  Has had episodes of HYPOGLYCEMIA interrupting his appointment with OPHTHALMOLOGY, which she had to reschedule.  Stabilized in the ED, has been stable since.  ENDOCRINE with no polyuria, polydipsia, polyphagia.  No blurred vision.  No skin, hair, nail changes.  No dramatic weight loss or weight gain.    Compliant with medications, tolerating regimens.  Physically active.  Eating sensibly.  No landon substernal chest pain, no orthopnea, no paroxysmal nocturnal dyspnea.  No particular cough or sputum production.  No headache, blurred vision, no diplopia, no dysphagia.  Some occasional arthralgia or myalgia perhaps, but nothing persistent, with no focal weakness.  No overlying skin changes.  No ataxia, no falls.  Continues to want to improve quality of life, and does not wish harm to self or others.  CONSTITUTIONALLY, no fever, no chills.  No night sweats.  No lingering anorexia or nausea.  No apparent lymphadenopathy.  No apparent weight loss.    Appetite preserved, no abdominal distress.  No dysuria, flank, suprapubic pain.  No particular skin changes.        Review of Systems  Review of systems as in history of present illness, and otherwise, reviewed separately as well, and was unremarkable/negative/noncontributory.        Objective   /63 (BP Location: Left arm, Patient Position: Sitting, BP Cuff Size: Adult)   Pulse 81   Ht 1.803 m (5' 11\")   Wt 102 kg (225 lb 6.4 oz)   SpO2 96%   BMI 31.44 kg/m²     Physical Exam  In good spirits.  Not in distress or diaphoresis.  Alert, oriented x 3.  Amiable.  Not unkempt.  Moderately built.  Receptive, cheerful, appropriate, and eager to maintain and hopefully improve quality of life.  Does not wish harm to self or others.  Moderately obese " build perhaps, but remaining independent and capable.  Appropriate.    HEAD pink palpebral conjunctivae, anicteric sclerae.  NECK supple, no apparent jugular venous distention.  CARDIOVASCULAR not in distress or diaphoresis.  No bipedal edema.  LUNGS not in distress or diaphoresis.  Not using accessory muscles.  ABDOMEN soft, nontender.  BACK no costovertebral angle tenderness.  EXTREMITIES no clubbing, no cyanosis.  NEURO no facial asymmetry.  No apparent cranial nerve deficits.  Romberg negative.  Ambulating without need of assistance.  No apparent focal weakness.  No tremors.  PSYCH receptive, appropriate, and eager to maintain and improve quality of life.        LABORATORY results reviewed, mixed hyperlipidemia, LDL cholesterol 114, current ASCVD risk 37%.  Hemoglobin A1c 6.4, current body mass index 31.44.  Preserved liver function.  Stable kidney function.  Ferritin 72.  Uric acid normal.  B12 replenished.  Vitamin D 41.  Stable anemia noted.   from September.  Again, currently, patient relatively asymptomatic.        Assessment/Plan   Diagnoses and all orders for this visit:  Type 2 diabetes mellitus with stage 3a chronic kidney disease, with long-term current use of insulin (Multi)  -     Comprehensive Metabolic Panel; Future  -     Urinalysis with Reflex Microscopic; Future  -     Hemoglobin A1C; Future  -     Albumin-Creatinine Ratio, Urine Random; Future  -     Follow Up In Primary Care - Established; Future  Mixed hyperlipidemia  -     Comprehensive Metabolic Panel; Future  -     Lipid Panel; Future  -     Follow Up In Primary Care - Established; Future  Essential hypertension  -     CBC and Auto Differential; Future  -     Comprehensive Metabolic Panel; Future  -     TSH with reflex to Free T4 if abnormal; Future  -     Urinalysis with Reflex Microscopic; Future  -     Hemoglobin A1C; Future  -     Follow Up In Primary Care - Established; Future  [place] cardiac risk >20% in next 10 years  -      Comprehensive Metabolic Panel; Future  -     Hemoglobin A1C; Future  -     Lipid Panel; Future  -     Follow Up In Primary Care - Established; Future  Stage 3a chronic kidney disease (Multi)  -     CBC and Auto Differential; Future  -     Comprehensive Metabolic Panel; Future  -     Urinalysis with Reflex Microscopic; Future  -     Albumin-Creatinine Ratio, Urine Random; Future  -     Creatine Kinase; Future  -     Follow Up In Primary Care - Established; Future  Interstitial myositis of multiple sites  -     Comprehensive Metabolic Panel; Future  -     Creatine Kinase; Future  -     Follow Up In Primary Care - Established; Future  Anemia due to vitamin B12 deficiency, unspecified B12 deficiency type  -     CBC and Auto Differential; Future  -     Urinalysis with Reflex Microscopic; Future  -     Follow Up In Primary Care - Established; Future  Vitamin B12 deficiency  -     CBC and Auto Differential; Future  -     Vitamin B12; Future  -     Follow Up In Primary Care - Established; Future  Vitamin D deficiency  -     Comprehensive Metabolic Panel; Future  -     Follow Up In Primary Care - Established; Future  Hyperuricemia  -     Comprehensive Metabolic Panel; Future  -     Uric Acid; Future  -     Follow Up In Primary Care - Established; Future       Thank you very much for coming.  I am very happy to see you today.    I am sorry that you did not complete your EYE EXAMINATION.  Please see your current EYE SPECIALIST, and afterwards, you will not have to see her again, and I will get you a new eye doctor afterwards.  Just see her once more.    Please call me and let me know what medicines you are missing, and I will send in for your refills.  Remember to ask for your DIABETES supplies from Dr. Jameson, ENDOCRINOLOGY, as well.  Also, please remember to keep your appointment with the KIDNEY specialist, Dr. Patel, as he recommends.    Please come back in 3 months.  Do some FASTING blood and urine examinations a few days  prior.  You can have these done at [facility].  This way, when I see you, I will be able to review the results with you, and they will also be ready for Dr. Jameson, your ENDOCRINOLOGIST, to review when you see him.  Please keep your appointment with him as well.    Please continue to drink lots of fluids throughout the day, and avoid excessive salt, and urinate often while awake.    Please continue to stay physically active.    Please continue to eat sensibly.  [employer] diet book, DASH diet, Mediterranean diet for ideas.    Please get yourself VACCINATED for SHINGLES.  You can get this from your local friendly pharmacy.  SHINGRIX comes in a series of 2 injections at least 1 month apart.  Please get this done soon.    Again, thank you very much for coming.  See you in 3 months.  Call or text sooner as needed.  Take care and God bless.  I hope you have a blessed Ankush Wednesday.            0  Return in 3 months.  40 minutes please.  Repeat FASTING laboratory examinations, then see me for Medicare Wellness evaluation for the year.  Coordinate with nephrology, ophthalmology, hopefully podiatry.  Prepare patient for next appointment with endocrinology.  Reassess mood, energy, function, preventive strategies, cardiovascular risk, compliance, tolerance.            0

## 2025-03-06 DIAGNOSIS — E55.9 VITAMIN D DEFICIENCY: ICD-10-CM

## 2025-03-06 DIAGNOSIS — N18.31 STAGE 3A CHRONIC KIDNEY DISEASE (MULTI): ICD-10-CM

## 2025-03-06 DIAGNOSIS — N18.31 TYPE 2 DIABETES MELLITUS WITH STAGE 3A CHRONIC KIDNEY DISEASE, WITH LONG-TERM CURRENT USE OF INSULIN (MULTI): ICD-10-CM

## 2025-03-06 DIAGNOSIS — Z79.4 TYPE 2 DIABETES MELLITUS WITH STAGE 3A CHRONIC KIDNEY DISEASE, WITH LONG-TERM CURRENT USE OF INSULIN (MULTI): ICD-10-CM

## 2025-03-06 DIAGNOSIS — E11.22 TYPE 2 DIABETES MELLITUS WITH STAGE 3A CHRONIC KIDNEY DISEASE, WITH LONG-TERM CURRENT USE OF INSULIN (MULTI): ICD-10-CM

## 2025-03-06 DIAGNOSIS — I10 ESSENTIAL HYPERTENSION: ICD-10-CM

## 2025-03-07 RX ORDER — LISINOPRIL 20 MG/1
TABLET ORAL
Qty: 180 TABLET | Refills: 0 | Status: SHIPPED | OUTPATIENT
Start: 2025-03-07

## 2025-03-07 RX ORDER — CHLORTHALIDONE 50 MG/1
50 TABLET ORAL DAILY
Qty: 90 TABLET | Refills: 0 | Status: SHIPPED | OUTPATIENT
Start: 2025-03-07

## 2025-03-07 RX ORDER — ERGOCALCIFEROL 1.25 MG/1
CAPSULE ORAL
Qty: 13 CAPSULE | Refills: 0 | Status: SHIPPED | OUTPATIENT
Start: 2025-03-07

## 2025-04-24 DIAGNOSIS — I10 ESSENTIAL HYPERTENSION: ICD-10-CM

## 2025-04-24 DIAGNOSIS — E79.0 HYPERURICEMIA: ICD-10-CM

## 2025-04-26 RX ORDER — ALLOPURINOL 100 MG/1
100 TABLET ORAL DAILY
Qty: 90 TABLET | Refills: 0 | Status: SHIPPED | OUTPATIENT
Start: 2025-04-26

## 2025-04-26 RX ORDER — AMLODIPINE BESYLATE 10 MG/1
10 TABLET ORAL DAILY
Qty: 90 TABLET | Refills: 1 | Status: SHIPPED | OUTPATIENT
Start: 2025-04-26 | End: 2025-10-23

## 2025-04-26 RX ORDER — ISOSORBIDE MONONITRATE 60 MG/1
60 TABLET, EXTENDED RELEASE ORAL DAILY
Qty: 90 TABLET | Refills: 0 | Status: SHIPPED | OUTPATIENT
Start: 2025-04-26

## 2025-05-26 DIAGNOSIS — N18.31 TYPE 2 DIABETES MELLITUS WITH STAGE 3A CHRONIC KIDNEY DISEASE, WITH LONG-TERM CURRENT USE OF INSULIN (MULTI): ICD-10-CM

## 2025-05-26 DIAGNOSIS — E78.2 MIXED HYPERLIPIDEMIA: ICD-10-CM

## 2025-05-26 DIAGNOSIS — Z79.4 TYPE 2 DIABETES MELLITUS WITH STAGE 3A CHRONIC KIDNEY DISEASE, WITH LONG-TERM CURRENT USE OF INSULIN (MULTI): ICD-10-CM

## 2025-05-26 DIAGNOSIS — E53.8 VITAMIN B12 DEFICIENCY: ICD-10-CM

## 2025-05-26 DIAGNOSIS — E11.22 TYPE 2 DIABETES MELLITUS WITH STAGE 3A CHRONIC KIDNEY DISEASE, WITH LONG-TERM CURRENT USE OF INSULIN (MULTI): ICD-10-CM

## 2025-05-26 DIAGNOSIS — N18.31 STAGE 3A CHRONIC KIDNEY DISEASE (MULTI): ICD-10-CM

## 2025-05-26 DIAGNOSIS — M60.19 INTERSTITIAL MYOSITIS OF MULTIPLE SITES: ICD-10-CM

## 2025-05-26 DIAGNOSIS — E79.0 HYPERURICEMIA: ICD-10-CM

## 2025-05-26 DIAGNOSIS — Z91.89 FRAMINGHAM CARDIAC RISK >20% IN NEXT 10 YEARS: ICD-10-CM

## 2025-05-26 DIAGNOSIS — E55.9 VITAMIN D DEFICIENCY: ICD-10-CM

## 2025-05-26 DIAGNOSIS — I10 ESSENTIAL HYPERTENSION: ICD-10-CM

## 2025-05-26 DIAGNOSIS — D51.9 ANEMIA DUE TO VITAMIN B12 DEFICIENCY, UNSPECIFIED B12 DEFICIENCY TYPE: ICD-10-CM

## 2025-06-11 ENCOUNTER — APPOINTMENT (OUTPATIENT)
Dept: PRIMARY CARE | Facility: CLINIC | Age: 70
End: 2025-06-11
Payer: COMMERCIAL

## 2025-06-11 VITALS
BODY MASS INDEX: 31.5 KG/M2 | HEIGHT: 71 IN | DIASTOLIC BLOOD PRESSURE: 86 MMHG | OXYGEN SATURATION: 96 % | WEIGHT: 225 LBS | HEART RATE: 75 BPM | SYSTOLIC BLOOD PRESSURE: 143 MMHG

## 2025-06-11 DIAGNOSIS — Z91.89 FRAMINGHAM CARDIAC RISK >20% IN NEXT 10 YEARS: ICD-10-CM

## 2025-06-11 DIAGNOSIS — E79.0 HYPERURICEMIA: ICD-10-CM

## 2025-06-11 DIAGNOSIS — E53.8 VITAMIN B12 DEFICIENCY: ICD-10-CM

## 2025-06-11 DIAGNOSIS — Z12.11 SCREENING FOR COLON CANCER: ICD-10-CM

## 2025-06-11 DIAGNOSIS — D51.9 ANEMIA DUE TO VITAMIN B12 DEFICIENCY, UNSPECIFIED B12 DEFICIENCY TYPE: ICD-10-CM

## 2025-06-11 DIAGNOSIS — Z00.00 ROUTINE GENERAL MEDICAL EXAMINATION AT HEALTH CARE FACILITY: Primary | ICD-10-CM

## 2025-06-11 DIAGNOSIS — I10 ESSENTIAL HYPERTENSION: ICD-10-CM

## 2025-06-11 DIAGNOSIS — E11.22 TYPE 2 DIABETES MELLITUS WITH STAGE 3A CHRONIC KIDNEY DISEASE, WITH LONG-TERM CURRENT USE OF INSULIN (MULTI): ICD-10-CM

## 2025-06-11 DIAGNOSIS — M60.19 INTERSTITIAL MYOSITIS OF MULTIPLE SITES: ICD-10-CM

## 2025-06-11 DIAGNOSIS — E55.9 VITAMIN D DEFICIENCY: ICD-10-CM

## 2025-06-11 DIAGNOSIS — Z79.4 TYPE 2 DIABETES MELLITUS WITH STAGE 3A CHRONIC KIDNEY DISEASE, WITH LONG-TERM CURRENT USE OF INSULIN (MULTI): ICD-10-CM

## 2025-06-11 DIAGNOSIS — K21.9 GASTROESOPHAGEAL REFLUX DISEASE, UNSPECIFIED WHETHER ESOPHAGITIS PRESENT: ICD-10-CM

## 2025-06-11 DIAGNOSIS — N18.31 TYPE 2 DIABETES MELLITUS WITH STAGE 3A CHRONIC KIDNEY DISEASE, WITH LONG-TERM CURRENT USE OF INSULIN (MULTI): ICD-10-CM

## 2025-06-11 DIAGNOSIS — E78.2 MIXED HYPERLIPIDEMIA: ICD-10-CM

## 2025-06-11 DIAGNOSIS — N18.31 STAGE 3A CHRONIC KIDNEY DISEASE (MULTI): ICD-10-CM

## 2025-06-11 PROCEDURE — 3077F SYST BP >= 140 MM HG: CPT | Performed by: INTERNAL MEDICINE

## 2025-06-11 PROCEDURE — 4010F ACE/ARB THERAPY RXD/TAKEN: CPT | Performed by: INTERNAL MEDICINE

## 2025-06-11 PROCEDURE — 99213 OFFICE O/P EST LOW 20 MIN: CPT | Performed by: INTERNAL MEDICINE

## 2025-06-11 PROCEDURE — 3079F DIAST BP 80-89 MM HG: CPT | Performed by: INTERNAL MEDICINE

## 2025-06-11 PROCEDURE — 3008F BODY MASS INDEX DOCD: CPT | Performed by: INTERNAL MEDICINE

## 2025-06-11 PROCEDURE — 1036F TOBACCO NON-USER: CPT | Performed by: INTERNAL MEDICINE

## 2025-06-11 PROCEDURE — 1123F ACP DISCUSS/DSCN MKR DOCD: CPT | Performed by: INTERNAL MEDICINE

## 2025-06-11 PROCEDURE — 1170F FXNL STATUS ASSESSED: CPT | Performed by: INTERNAL MEDICINE

## 2025-06-11 PROCEDURE — G0439 PPPS, SUBSEQ VISIT: HCPCS | Performed by: INTERNAL MEDICINE

## 2025-06-11 PROCEDURE — 1160F RVW MEDS BY RX/DR IN RCRD: CPT | Performed by: INTERNAL MEDICINE

## 2025-06-11 PROCEDURE — 1159F MED LIST DOCD IN RCRD: CPT | Performed by: INTERNAL MEDICINE

## 2025-06-11 RX ORDER — FAMOTIDINE 20 MG/1
TABLET, FILM COATED ORAL
Qty: 180 TABLET | Refills: 1 | Status: SHIPPED | OUTPATIENT
Start: 2025-06-11

## 2025-06-11 RX ORDER — ISOSORBIDE MONONITRATE 60 MG/1
60 TABLET, EXTENDED RELEASE ORAL DAILY
Qty: 90 TABLET | Refills: 0 | Status: SHIPPED | OUTPATIENT
Start: 2025-06-11

## 2025-06-11 RX ORDER — CHLORTHALIDONE 50 MG/1
50 TABLET ORAL DAILY
Qty: 90 TABLET | Refills: 0 | Status: SHIPPED | OUTPATIENT
Start: 2025-06-11

## 2025-06-11 RX ORDER — AMLODIPINE BESYLATE 10 MG/1
10 TABLET ORAL DAILY
Qty: 90 TABLET | Refills: 1 | Status: SHIPPED | OUTPATIENT
Start: 2025-06-11 | End: 2025-12-08

## 2025-06-11 ASSESSMENT — PATIENT HEALTH QUESTIONNAIRE - PHQ9
2. FEELING DOWN, DEPRESSED OR HOPELESS: NOT AT ALL
SUM OF ALL RESPONSES TO PHQ9 QUESTIONS 1 AND 2: 0
1. LITTLE INTEREST OR PLEASURE IN DOING THINGS: NOT AT ALL

## 2025-06-11 ASSESSMENT — ACTIVITIES OF DAILY LIVING (ADL)
GROCERY_SHOPPING: INDEPENDENT
TAKING_MEDICATION: INDEPENDENT
MANAGING_FINANCES: INDEPENDENT
DRESSING: INDEPENDENT
DOING_HOUSEWORK: INDEPENDENT
BATHING: INDEPENDENT

## 2025-06-11 ASSESSMENT — ENCOUNTER SYMPTOMS
LOSS OF SENSATION IN FEET: 0
DEPRESSION: 0
OCCASIONAL FEELINGS OF UNSTEADINESS: 1

## 2025-06-11 NOTE — PATIENT INSTRUCTIONS
Thank you very much for coming.  I am very happy to see you again.    We did your Medicare Wellness evaluation today, and you seem to be doing well.  We are still waiting on your laboratory results.  In the meantime, you state that you are having trouble walking sometimes, but you are better with SHOES prescribed by your FOOT SPECIALIST.  Please continue to follow with your podiatrist as recommended, at least once every 6 months.  Very important.    I am also glad to hear that you saw your EYE SPECIALIST, and that you are stable.  Please see your eye doctor at least once a year.    I am also glad to hear that you saw Dr. Patel, NEPHROLOGY.  Please continue to follow-up with him as recommended.  He will help us keep your kidneys healthy!  Please drink lots of fluids throughout the day, and avoid excessive salt.  Very good for kidneys, and very good for keeping your blood pressure controlled.    You have an appointment with Dr. Jameson, ENDOCRINOLOGY, June 26.  Please keep your appointment.  Please let him know that you had your laboratory examinations done, and that they are in Epic.    Please continue to stay physically active.  Do more brisk walking please.  Use the shoes that were prescribed to you by your foot specialist!    We will keep your DAUGHTER as your DURABLE POWER OF  for healthcare matters.  I appreciate your help!  If anything ever happens to you, and if you are unable to express yourself, she will be the person whom we will depend on to speak on your behalf.  She will be able to make medical decisions for you.  Very helpful.    Your CODE STATUS is FULL CODE, meaning that we will everything possible to save your life and to preserve it.  Afterwards, when you are stable, we will discuss with you and your family how you are doing and what your options are.  At the very least, we will try our best to maintain your quality of life and keep you comfortable.    Again, thank you very much for coming.   Please come back in 3 months, so that we can make sure that you remain healthy and get better every time!    Again, please do more brisk walking.  Please eat more sensibly.  MetaModix diet book, DASH diet, Mediterranean diet for ideas.    Another COLOGUARD kit will be sent to your home.  Please read the instructions and send it back.    You will benefit from the SHINGLES vaccination, very important.  Highly recommended, to be paid for by your insurance, and available from your local friendly pharmacy.  SHINGRIX comes in a series of 2 injections at least 2 months apart.  Please get this done from your local friendly pharmacy.  It will protect you for your whole lifetime.    Again, thank you very much for coming.  See you in 3 months.  Call or text sooner as needed.  I will wait on word from Dr. Jameson and Dr. Patel!  Take care and God bless.            0  Return in 3 months.  20 minutes please.  Reassess compliance, tolerance, blood pressure control, blood sugar control, review laboratory results.  Coordinate with nephrology, endocrinology, ophthalmology, podiatry, as patient is seen.  Review preventive strategies, cardiovascular risk.            0

## 2025-06-11 NOTE — PROGRESS NOTES
Subjective   Reason for Visit: Markos Interiano is an 69 y.o. male here for a Medicare Wellness visit.     Past Medical, Surgical, and Family History reviewed and updated in chart.    Reviewed all medications by prescribing practitioner or clinical pharmacist (such as prescriptions, OTCs, herbal therapies and supplements) and documented in the medical record.    HPI  The patient has been compliant with plans of care, including having his FASTING laboratory examinations done yesterday.  Results are not yet back.  In the meantime, he states that he has been compliant with diet and medications, but could probably do more with exercise.  He states that initially, he may have some ATAXIA perhaps when he gets up, but he has not had any falls.  Symptoms are short-lived, only in the morning.  No headache, blurred vision, no diplopia, no dysphagia.    Seen by OPHTHALMOLOGY, and was found to be stable.  Seen by PODIATRY, and was prescribed diabetic shoes, which the patient states have made a big difference.  He sees the podiatrist regularly for reevaluation of nails and any new skin breakdown.  None have been found during the last visit.    Seen by NEPHROLOGY, and was told that kidney function is stable.  Blood pressure controlled.  No further changes recommended, but also reminded to please keep next appointments.    The patient could probably be more physically active.  In the meantime, no landon substernal chest pain, no orthopnea, no paroxysmal nocturnal dyspnea.  No headache, blurred vision, no diplopia, no dysphagia.  Continues to want to improve quality of life, does not wish harm to self or others.  No particular cough or sputum production.  No abdominal distress.  No dysuria.  No particular skin changes.  ENDOCRINE with no polyuria, polydipsia, polyphagia.  No blurred vision.  No skin, hair, nail changes.  No dramatic weight loss or weight gain.  CONSTITUTIONALLY, no fever, no chills.  No night sweats.  No  "lingering anorexia or nausea.  No apparent lymphadenopathy.  No apparent weight loss.      Medicare Wellness evaluation, LIVING WILL wishes, CODE STATUS, also reviewed today, please see.    Patient Care Team:  Bill Mendieta MD as PCP - General  Bill Mendieta MD as PCP - [employer] PCP         Review of Systems  Review of systems as in history of present illness, and otherwise, reviewed separately as well, and was unremarkable/negative/noncontributory.        Objective   Vitals:  /86 (BP Location: Left arm, Patient Position: Sitting, BP Cuff Size: Adult)   Pulse 75   Ht 1.803 m (5' 11\")   Wt 102 kg (225 lb)   SpO2 96%   BMI 31.38 kg/m²       Physical Exam  In good spirits.  Not in distress or diaphoresis.  Alert, oriented x 3.  Amiable.  Not unkempt.  Moderately obese build, but remaining independent, capable, appropriate.  Does want to improve quality of life, and does not wish harm to self or others.    HEAD pink palpebral conjunctivae, anicteric sclerae.  NECK supple, no apparent jugular venous distention.  CARDIOVASCULAR not in distress or diaphoresis.  No bipedal edema.  LUNGS not in distress or diaphoresis.  Not using accessory muscles.  ABDOMEN soft, nontender.  BACK no costovertebral angle tenderness.  EXTREMITIES no clubbing, no cyanosis.  NEURO no facial asymmetry.  No apparent cranial nerve deficits.  Romberg negative.  Ambulating without need of assistance.  Currently, no apparent ataxia, no need for assistive devices.  No apparent focal weakness.  No tremors.  PSYCH receptive, appropriate, and eager to maintain and improve quality of life.        LABORATORY examinations done yesterday, not yet resulted.  DECEMBER workup did reveal anemia, vitamin B12 replenished.  Stage IIIa chronic kidney disease, proteinuria noted.  Mixed hyperlipidemia, preserved liver function.  ASCVD risk 43.1%  Hemoglobin A1c 6.4, with current body mass index 31.38.  Vitamin D 41.  TSH " adequate.  CPK elevated.  Uric acid negative.  Again, updated laboratory examinations still pending.        ASSESSMENT/Plans:  Markos was seen today for medicare annual wellness visit subsequent.  Diagnoses and all orders for this visit:  Routine general medical examination at health care facility (Primary)  -     1 Year Follow Up In Primary Care - Wellness Exam; Future  -     Follow Up In Primary Care - Established; Future  Essential hypertension  -     Follow Up In Primary Care - Established  -     isosorbide mononitrate ER (Imdur) 60 mg 24 hr tablet; Take 1 tablet (60 mg) by mouth once daily.  -     chlorthalidone (Hygroton) 50 mg tablet; Take 1 tablet (50 mg) by mouth once daily. TAKE 1 TABLET Daily Please take with breakfast and a banana. Thank you.  -     amLODIPine (Norvasc) 10 mg tablet; Take 1 tablet (10 mg) by mouth once daily.  -     Follow Up In Primary Care - Established; Future  Mixed hyperlipidemia  -     Follow Up In Primary Care - [facility]; Future  Type 2 diabetes mellitus with stage 3a chronic kidney disease, with long-term current use of insulin (Multi)  -     Follow Up In Primary Care - [facility]; Future  [place] cardiac risk >20% in next 10 years  -     Follow Up In Primary Care - [facility]; Future  Stage 3a chronic kidney disease (Multi)  -     Follow Up In Primary Care - [facility]; Future  Interstitial myositis of multiple sites  -     Follow Up In Primary Care - [facility]; Future  Anemia due to vitamin B12 deficiency, unspecified B12 deficiency type  -     Follow Up In Primary Care - [facility]; Future  Vitamin B12 deficiency  -     Follow Up In Primary Care - [facility]; Future  Vitamin D deficiency  -     Follow Up In Primary Care - [facility]; Future  Hyperuricemia  -     Follow Up In Primary Care - [facility]; Future  Gastroesophageal reflux disease, unspecified whether esophagitis present  -     famotidine (Pepcid) 20 mg tablet; TAKE 1 TABLET Twice daily PLEASE take  before breakfast and before supper.  -     Follow Up In Primary Care - Established; Future  Screening for colon cancer  -     Cologuard® colon cancer screening; Future  -     Cologuard® colon cancer screening  -     Follow Up In Primary Care - Established; Future    Thank you very much for coming.  I am very happy to see you again.    We did your Medicare Wellness evaluation today, and you seem to be doing well.  We are still waiting on your laboratory results.  In the meantime, you state that you are having trouble walking sometimes, but you are better with SHOES prescribed by your FOOT SPECIALIST.  Please continue to follow with your podiatrist as recommended, at least once every 6 months.  Very important.    I am also glad to hear that you saw your EYE SPECIALIST, and that you are stable.  Please see your eye doctor at least once a year.    I am also glad to hear that you saw Dr. Patel, NEPHROLOGY.  Please continue to follow-up with him as recommended.  He will help us keep your kidneys healthy!  Please drink lots of fluids throughout the day, and avoid excessive salt.  Very good for kidneys, and very good for keeping your blood pressure controlled.    You have an appointment with Dr. Jameson, ENDOCRINOLOGY, June 26.  Please keep your appointment.  Please let him know that you had your laboratory examinations done, and that they are in Epic.    Please continue to stay physically active.  Do more brisk walking please.  Use the shoes that were prescribed to you by your foot specialist!    We will keep your DAUGHTER as your DURABLE POWER OF  for healthcare matters.  I appreciate your help!  If anything ever happens to you, and if you are unable to express yourself, she will be the person whom we will depend on to speak on your behalf.  She will be able to make medical decisions for you.  Very helpful.    Your CODE STATUS is FULL CODE, meaning that we will everything possible to save your life and to preserve  it.  Afterwards, when you are stable, we will discuss with you and your family how you are doing and what your options are.  At the very least, we will try our best to maintain your quality of life and keep you comfortable.    Again, thank you very much for coming.  Please come back in 3 months, so that we can make sure that you remain healthy and get better every time!    Again, please do more brisk walking.  Please eat more sensibly.  [employer] diet book, DASH diet, Mediterranean diet for ideas.    Another COLOGUARD kit will be sent to your home.  Please read the instructions and send it back.    You will benefit from the SHINGLES vaccination, very important.  Highly recommended, to be paid for by your insurance, and available from your local friendly pharmacy.  SHINGRIX comes in a series of 2 injections at least 2 months apart.  Please get this done from your local friendly pharmacy.  It will protect you for your whole lifetime.    Again, thank you very much for coming.  See you in 3 months.  Call or text sooner as needed.  I will wait on word from Dr. Jameson and Dr. Patel!  Take care and God bless.            0  Return in 3 months.  20 minutes please.  Reassess compliance, tolerance, blood pressure control, blood sugar control, review laboratory results.  Coordinate with nephrology, endocrinology, ophthalmology, podiatry, as patient is seen.  Review preventive strategies, cardiovascular risk.            0

## 2025-09-17 ENCOUNTER — APPOINTMENT (OUTPATIENT)
Dept: PRIMARY CARE | Facility: CLINIC | Age: 70
End: 2025-09-17
Payer: COMMERCIAL